# Patient Record
Sex: FEMALE | Race: BLACK OR AFRICAN AMERICAN | NOT HISPANIC OR LATINO | Employment: UNEMPLOYED | URBAN - METROPOLITAN AREA
[De-identification: names, ages, dates, MRNs, and addresses within clinical notes are randomized per-mention and may not be internally consistent; named-entity substitution may affect disease eponyms.]

---

## 2017-07-11 ENCOUNTER — ANESTHESIA EVENT (OUTPATIENT)
Dept: SURGERY | Facility: OTHER | Age: 37
End: 2017-07-11
Payer: MEDICAID

## 2017-07-11 ENCOUNTER — HOSPITAL ENCOUNTER (OUTPATIENT)
Dept: PREADMISSION TESTING | Facility: OTHER | Age: 37
Discharge: HOME | End: 2017-07-11
Attending: PLASTIC SURGERY
Payer: MEDICAID

## 2017-07-11 VITALS
BODY MASS INDEX: 26.66 KG/M2 | HEART RATE: 72 BPM | TEMPERATURE: 98 F | WEIGHT: 160 LBS | OXYGEN SATURATION: 98 % | SYSTOLIC BLOOD PRESSURE: 165 MMHG | DIASTOLIC BLOOD PRESSURE: 91 MMHG | HEIGHT: 65 IN

## 2017-07-11 DIAGNOSIS — D64.9 ANEMIA, UNSPECIFIED TYPE: Primary | ICD-10-CM

## 2017-07-11 LAB
HCT VFR BLD AUTO: 33.1 %
HGB BLD-MCNC: 9.4 G/DL

## 2017-07-11 PROCEDURE — 85014 HEMATOCRIT: CPT

## 2017-07-11 PROCEDURE — 36415 COLL VENOUS BLD VENIPUNCTURE: CPT

## 2017-07-11 PROCEDURE — 85018 HEMOGLOBIN: CPT

## 2017-07-11 RX ORDER — OXYCODONE HYDROCHLORIDE 5 MG/1
5 TABLET ORAL ONCE AS NEEDED
Status: CANCELLED | OUTPATIENT
Start: 2017-07-11 | End: 2017-07-11

## 2017-07-11 RX ORDER — LIDOCAINE HYDROCHLORIDE 10 MG/ML
1 INJECTION, SOLUTION EPIDURAL; INFILTRATION; INTRACAUDAL; PERINEURAL ONCE
Status: CANCELLED | OUTPATIENT
Start: 2017-07-11 | End: 2017-07-11

## 2017-07-11 RX ORDER — SODIUM CHLORIDE, SODIUM LACTATE, POTASSIUM CHLORIDE, CALCIUM CHLORIDE 600; 310; 30; 20 MG/100ML; MG/100ML; MG/100ML; MG/100ML
INJECTION, SOLUTION INTRAVENOUS CONTINUOUS
Status: CANCELLED | OUTPATIENT
Start: 2017-07-11

## 2017-07-11 RX ORDER — FAMOTIDINE 20 MG/1
20 TABLET, FILM COATED ORAL
Status: CANCELLED | OUTPATIENT
Start: 2017-07-11 | End: 2017-07-11

## 2017-07-11 NOTE — DISCHARGE INSTRUCTIONS
PRE-ADMIT TESTING -  [phone]    [address]  [facility]        OUTPATIENT SURGERY UNIT - [phone]    Your surgery has been scheduled at [facility]. We are pleased to have the opportunity to serve you. For Further Information please call [phone].    On the day of surgery please report to the Information Desk on the 1st floor.    · CONTACT YOUR PHYSICIAN'S OFFICE THE DAY PRIOR TO YOUR SURGERY TO OBTAIN YOUR ARRIVAL TIME.     · The evening before surgery do not eat anything after 9 p.m. ( this includes hard candy, chewing gum and mints).  You may only have GATORADE, POWERADE AND WATER  from 9 p.m. until you leave your home.   DO NOT DRINK ANY LIQUIDS ON THE WAY TO THE HOSPITAL.      SPECIAL MEDICATION INSTRUCTIONS: TAKE medications checked off by the Anesthesiologist on your Medication List.    Angiogram Patients: Take medications as instructed by your physician, including aspirin.     Surgery Patients:    If you take ASPIRIN - Your PHYSICIAN/SURGEON will need to inform you IF/OR when you need to stop taking aspirin prior to your surgery.     Do Not take any medications containing IBUPROFEN.  Do Not Wear any make-up or dark nail polish   (especially eye make-up) to surgery. If you come to surgery with makeup on you will be required to remove the makeup or nail polish.    Do not shave your surgical area at least 5 days prior to your surgery. The surgical prep will be performed at the hospital according to Infection Control regulations.    Leave all valuables at home.   Do Not wear any jewelry or watches, including any metal in body piercings.  Contact Lens must be removed before surgery. Either do not wear the contact lens or bring a case and solution for storage.  Please bring a container for eyeglasses or dentures as required.  Bring any paperwork your physician has provided, such as consent forms,  history and physicals, doctor's orders, etc.   Bring comfortable clothes  that are loose fitting to wear upon discharge. Take into consideration the type of surgery being performed.  Maintain your diet as advised per your physician the day prior to surgery.      Adequate rest the night before surgery is advised.   Park in the Parking lot behind the hospital or in the [facility] across the street from the parking lot. Parking is complimentary.  If you will be discharged the same day as your procedure, please arrange for a responsible adult to drive you home or to accompany you if traveling by taxi.   YOU WILL NOT BE PERMITTED TO DRIVE OR TO LEAVE THE HOSPITAL ALONE AFTER SURGERY.   It is strongly recommended that you arrange for someone to remain with you for the first 24 hrs following your surgery.       Thank you for your cooperation.  The Staff of [facility].        Bathing Instructions                                                                 Please shower the evening before and morning of your procedure with    ANTIBACTERIAL SOAP. ( DIAL, etc )  Concentrate on the surgical area   for at least 3 minutes and rinse completely. Dry off as usual.   Do not use any deodorant, powder, body lotions, perfume, after shave or    cologne.

## 2017-07-11 NOTE — ANESTHESIA PREPROCEDURE EVALUATION
07/11/2017  Susanne Hutton is a 37 y.o., female.    Anesthesia Evaluation    I have reviewed the Patient Summary Reports.    I have reviewed the Nursing Notes.   I have reviewed the Medications.     Review of Systems  Anesthesia Hx:  No problems with previous Anesthesia  Denies Family Hx of Anesthesia complications.   Denies Personal Hx of Anesthesia complications.   Social:  Non-Smoker    Hematology/Oncology:        Current/Recent Cancer. Breast left   EENT/Dental:EENT/Dental Normal   Cardiovascular:   Exercise tolerance: good Hypertension    Pulmonary:  Pulmonary Normal    Renal/:  Renal/ Normal     Hepatic/GI:   Liver Disease,    Musculoskeletal:  Musculoskeletal Normal    Neurological:  Neurology Normal    Endocrine:  Endocrine Normal    Dermatological:  Skin Normal    Psych:  Psychiatric Normal           Physical Exam  General:  Well nourished    Airway/Jaw/Neck:  Airway Findings: Mouth Opening: Normal Tongue: Normal  General Airway Assessment: Adult  Mallampati: II  TM Distance: Normal, at least 6 cm      Dental:  Dental Findings: In tact        Mental Status:  Mental Status Findings:  Cooperative, Alert and Oriented         Anesthesia Plan  Type of Anesthesia, risks & benefits discussed:  Anesthesia Type:  general  Patient's Preference:   Intra-op Monitoring Plan: standard ASA monitors  Intra-op Monitoring Plan Comments:   Post Op Pain Control Plan:   Post Op Pain Control Plan Comments:   Induction:   IV  Beta Blocker:         Informed Consent: Patient understands risks and agrees with Anesthesia plan.  Questions answered. Anesthesia consent signed with patient.  ASA Score: 2     Day of Surgery Review of History & Physical:    H&P update referred to the surgeon.     Anesthesia Plan Notes: .        Ready For Surgery From Anesthesia Perspective.

## 2017-07-13 NOTE — PLAN OF CARE
07/13/17 1159   ED Admissions Case Approval   ED Admissions Case Approval (!) CM Approved  (OP )

## 2017-07-14 ENCOUNTER — HOSPITAL ENCOUNTER (OUTPATIENT)
Facility: OTHER | Age: 37
Discharge: HOME | End: 2017-07-14
Attending: PLASTIC SURGERY | Admitting: PLASTIC SURGERY
Payer: MEDICAID

## 2017-07-14 ENCOUNTER — ANESTHESIA (OUTPATIENT)
Dept: SURGERY | Facility: OTHER | Age: 37
End: 2017-07-14
Payer: MEDICAID

## 2017-07-14 VITALS
DIASTOLIC BLOOD PRESSURE: 94 MMHG | BODY MASS INDEX: 26.66 KG/M2 | SYSTOLIC BLOOD PRESSURE: 168 MMHG | OXYGEN SATURATION: 99 % | HEIGHT: 65 IN | TEMPERATURE: 98 F | WEIGHT: 160 LBS | HEART RATE: 70 BPM | RESPIRATION RATE: 16 BRPM

## 2017-07-14 DIAGNOSIS — Z85.3 HX OF MALIGNANT NEOPLASM OF BREAST: ICD-10-CM

## 2017-07-14 DIAGNOSIS — Z85.3 PERSONAL HISTORY OF MALIGNANT NEOPLASM OF BREAST: Primary | ICD-10-CM

## 2017-07-14 LAB
B-HCG UR QL: NEGATIVE
CTP QC/QA: YES

## 2017-07-14 PROCEDURE — 63600175 PHARM REV CODE 636 W HCPCS: Performed by: PLASTIC SURGERY

## 2017-07-14 PROCEDURE — 71000015 HC POSTOP RECOV 1ST HR: Performed by: PLASTIC SURGERY

## 2017-07-14 PROCEDURE — 36000707: Performed by: PLASTIC SURGERY

## 2017-07-14 PROCEDURE — C1789 PROSTHESIS, BREAST, IMP: HCPCS | Performed by: PLASTIC SURGERY

## 2017-07-14 PROCEDURE — 81025 URINE PREGNANCY TEST: CPT | Performed by: SPECIALIST

## 2017-07-14 PROCEDURE — 37000009 HC ANESTHESIA EA ADD 15 MINS: Performed by: PLASTIC SURGERY

## 2017-07-14 PROCEDURE — 63600175 PHARM REV CODE 636 W HCPCS: Performed by: ANESTHESIOLOGY

## 2017-07-14 PROCEDURE — 63600175 PHARM REV CODE 636 W HCPCS: Performed by: NURSE ANESTHETIST, CERTIFIED REGISTERED

## 2017-07-14 PROCEDURE — 36000706: Performed by: PLASTIC SURGERY

## 2017-07-14 PROCEDURE — 27201423 OPTIME MED/SURG SUP & DEVICES STERILE SUPPLY: Performed by: PLASTIC SURGERY

## 2017-07-14 PROCEDURE — 71000033 HC RECOVERY, INTIAL HOUR: Performed by: PLASTIC SURGERY

## 2017-07-14 PROCEDURE — 25000003 PHARM REV CODE 250: Performed by: SPECIALIST

## 2017-07-14 PROCEDURE — 25000003 PHARM REV CODE 250: Performed by: PLASTIC SURGERY

## 2017-07-14 PROCEDURE — 71000039 HC RECOVERY, EACH ADD'L HOUR: Performed by: PLASTIC SURGERY

## 2017-07-14 PROCEDURE — 25000003 PHARM REV CODE 250: Performed by: NURSE ANESTHETIST, CERTIFIED REGISTERED

## 2017-07-14 PROCEDURE — 37000008 HC ANESTHESIA 1ST 15 MINUTES: Performed by: PLASTIC SURGERY

## 2017-07-14 PROCEDURE — C1729 CATH, DRAINAGE: HCPCS | Performed by: PLASTIC SURGERY

## 2017-07-14 PROCEDURE — 71000016 HC POSTOP RECOV ADDL HR: Performed by: PLASTIC SURGERY

## 2017-07-14 DEVICE — IMPLANTABLE DEVICE: Type: IMPLANTABLE DEVICE | Site: BREAST | Status: FUNCTIONAL

## 2017-07-14 RX ORDER — DEXAMETHASONE SODIUM PHOSPHATE 4 MG/ML
INJECTION, SOLUTION INTRA-ARTICULAR; INTRALESIONAL; INTRAMUSCULAR; INTRAVENOUS; SOFT TISSUE
Status: DISCONTINUED | OUTPATIENT
Start: 2017-07-14 | End: 2017-07-14

## 2017-07-14 RX ORDER — PROPOFOL 10 MG/ML
VIAL (ML) INTRAVENOUS
Status: DISCONTINUED | OUTPATIENT
Start: 2017-07-14 | End: 2017-07-14

## 2017-07-14 RX ORDER — SODIUM CHLORIDE, SODIUM LACTATE, POTASSIUM CHLORIDE, CALCIUM CHLORIDE 600; 310; 30; 20 MG/100ML; MG/100ML; MG/100ML; MG/100ML
INJECTION, SOLUTION INTRAVENOUS CONTINUOUS
Status: DISCONTINUED | OUTPATIENT
Start: 2017-07-14 | End: 2017-07-14 | Stop reason: HOSPADM

## 2017-07-14 RX ORDER — LIDOCAINE HCL/PF 100 MG/5ML
SYRINGE (ML) INTRAVENOUS
Status: DISCONTINUED | OUTPATIENT
Start: 2017-07-14 | End: 2017-07-14

## 2017-07-14 RX ORDER — LIDOCAINE HYDROCHLORIDE 10 MG/ML
1 INJECTION, SOLUTION EPIDURAL; INFILTRATION; INTRACAUDAL; PERINEURAL ONCE
Status: DISCONTINUED | OUTPATIENT
Start: 2017-07-14 | End: 2017-07-14 | Stop reason: HOSPADM

## 2017-07-14 RX ORDER — OXYCODONE HYDROCHLORIDE 5 MG/1
5 TABLET ORAL
Status: DISCONTINUED | OUTPATIENT
Start: 2017-07-14 | End: 2017-07-14 | Stop reason: HOSPADM

## 2017-07-14 RX ORDER — ESMOLOL HYDROCHLORIDE 10 MG/ML
INJECTION INTRAVENOUS
Status: DISCONTINUED | OUTPATIENT
Start: 2017-07-14 | End: 2017-07-14

## 2017-07-14 RX ORDER — CEFAZOLIN SODIUM 1 G/3ML
INJECTION, POWDER, FOR SOLUTION INTRAMUSCULAR; INTRAVENOUS
Status: DISCONTINUED | OUTPATIENT
Start: 2017-07-14 | End: 2017-07-14 | Stop reason: HOSPADM

## 2017-07-14 RX ORDER — ROCURONIUM BROMIDE 10 MG/ML
INJECTION, SOLUTION INTRAVENOUS
Status: DISCONTINUED | OUTPATIENT
Start: 2017-07-14 | End: 2017-07-14

## 2017-07-14 RX ORDER — FAMOTIDINE 20 MG/1
20 TABLET, FILM COATED ORAL
Status: COMPLETED | OUTPATIENT
Start: 2017-07-14 | End: 2017-07-14

## 2017-07-14 RX ORDER — BACITRACIN 50000 [IU]/1
INJECTION, POWDER, FOR SOLUTION INTRAMUSCULAR
Status: DISCONTINUED | OUTPATIENT
Start: 2017-07-14 | End: 2017-07-14 | Stop reason: HOSPADM

## 2017-07-14 RX ORDER — GENTAMICIN SULFATE 80 MG/100ML
INJECTION, SOLUTION INTRAVENOUS
Status: DISCONTINUED | OUTPATIENT
Start: 2017-07-14 | End: 2017-07-14 | Stop reason: HOSPADM

## 2017-07-14 RX ORDER — OXYCODONE AND ACETAMINOPHEN 7.5; 325 MG/1; MG/1
1 TABLET ORAL EVERY 4 HOURS PRN
Qty: 45 TABLET | Refills: 0 | Status: SHIPPED | OUTPATIENT
Start: 2017-07-14 | End: 2017-11-28

## 2017-07-14 RX ORDER — FENTANYL CITRATE 50 UG/ML
INJECTION, SOLUTION INTRAMUSCULAR; INTRAVENOUS
Status: DISCONTINUED | OUTPATIENT
Start: 2017-07-14 | End: 2017-07-14

## 2017-07-14 RX ORDER — ACETAMINOPHEN 10 MG/ML
INJECTION, SOLUTION INTRAVENOUS
Status: DISCONTINUED | OUTPATIENT
Start: 2017-07-14 | End: 2017-07-14

## 2017-07-14 RX ORDER — ONDANSETRON 4 MG/1
4 TABLET, ORALLY DISINTEGRATING ORAL EVERY 6 HOURS PRN
Qty: 30 TABLET | Refills: 0 | Status: SHIPPED | OUTPATIENT
Start: 2017-07-14 | End: 2018-05-28

## 2017-07-14 RX ORDER — CEFAZOLIN SODIUM 2 G/50ML
2 SOLUTION INTRAVENOUS
Status: COMPLETED | OUTPATIENT
Start: 2017-07-14 | End: 2017-07-14

## 2017-07-14 RX ORDER — HYDROMORPHONE HYDROCHLORIDE 2 MG/ML
0.4 INJECTION, SOLUTION INTRAMUSCULAR; INTRAVENOUS; SUBCUTANEOUS EVERY 5 MIN PRN
Status: DISCONTINUED | OUTPATIENT
Start: 2017-07-14 | End: 2017-07-14 | Stop reason: HOSPADM

## 2017-07-14 RX ORDER — OXYCODONE HYDROCHLORIDE 5 MG/1
5 TABLET ORAL ONCE AS NEEDED
Status: DISCONTINUED | OUTPATIENT
Start: 2017-07-14 | End: 2017-07-14 | Stop reason: HOSPADM

## 2017-07-14 RX ORDER — SODIUM CHLORIDE 0.9 % (FLUSH) 0.9 %
3 SYRINGE (ML) INJECTION
Status: DISCONTINUED | OUTPATIENT
Start: 2017-07-14 | End: 2017-07-14 | Stop reason: HOSPADM

## 2017-07-14 RX ORDER — MEPERIDINE HYDROCHLORIDE 50 MG/ML
12.5 INJECTION INTRAMUSCULAR; INTRAVENOUS; SUBCUTANEOUS ONCE AS NEEDED
Status: DISCONTINUED | OUTPATIENT
Start: 2017-07-14 | End: 2017-07-14 | Stop reason: HOSPADM

## 2017-07-14 RX ORDER — CEPHALEXIN 500 MG/1
500 CAPSULE ORAL EVERY 6 HOURS
Qty: 28 CAPSULE | Refills: 0 | Status: SHIPPED | OUTPATIENT
Start: 2017-07-14 | End: 2017-07-21

## 2017-07-14 RX ORDER — FENTANYL CITRATE 50 UG/ML
25 INJECTION, SOLUTION INTRAMUSCULAR; INTRAVENOUS EVERY 5 MIN PRN
Status: COMPLETED | OUTPATIENT
Start: 2017-07-14 | End: 2017-07-14

## 2017-07-14 RX ADMIN — FENTANYL CITRATE 50 MCG: 50 INJECTION, SOLUTION INTRAMUSCULAR; INTRAVENOUS at 03:07

## 2017-07-14 RX ADMIN — PROPOFOL 200 MG: 10 INJECTION, EMULSION INTRAVENOUS at 02:07

## 2017-07-14 RX ADMIN — ROCURONIUM BROMIDE 40 MG: 10 INJECTION, SOLUTION INTRAVENOUS at 02:07

## 2017-07-14 RX ADMIN — PROPOFOL 30 MG: 10 INJECTION, EMULSION INTRAVENOUS at 03:07

## 2017-07-14 RX ADMIN — FAMOTIDINE 20 MG: 20 TABLET, FILM COATED ORAL at 11:07

## 2017-07-14 RX ADMIN — FENTANYL CITRATE 100 MCG: 50 INJECTION, SOLUTION INTRAMUSCULAR; INTRAVENOUS at 02:07

## 2017-07-14 RX ADMIN — LIDOCAINE HYDROCHLORIDE 50 MG: 20 INJECTION, SOLUTION INTRAVENOUS at 02:07

## 2017-07-14 RX ADMIN — CEFAZOLIN SODIUM 2 G: 2 SOLUTION INTRAVENOUS at 02:07

## 2017-07-14 RX ADMIN — FENTANYL CITRATE 25 MCG: 50 INJECTION INTRAMUSCULAR; INTRAVENOUS at 05:07

## 2017-07-14 RX ADMIN — HYDROMORPHONE HYDROCHLORIDE 0.4 MG: 2 INJECTION INTRAMUSCULAR; INTRAVENOUS; SUBCUTANEOUS at 05:07

## 2017-07-14 RX ADMIN — ESMOLOL HYDROCHLORIDE 20 MG: 10 INJECTION, SOLUTION INTRAVENOUS at 02:07

## 2017-07-14 RX ADMIN — DEXAMETHASONE SODIUM PHOSPHATE 8 MG: 4 INJECTION, SOLUTION INTRAMUSCULAR; INTRAVENOUS at 02:07

## 2017-07-14 RX ADMIN — SODIUM CHLORIDE, SODIUM LACTATE, POTASSIUM CHLORIDE, AND CALCIUM CHLORIDE: 600; 310; 30; 20 INJECTION, SOLUTION INTRAVENOUS at 12:07

## 2017-07-14 RX ADMIN — SODIUM CHLORIDE, SODIUM LACTATE, POTASSIUM CHLORIDE, AND CALCIUM CHLORIDE: 600; 310; 30; 20 INJECTION, SOLUTION INTRAVENOUS at 02:07

## 2017-07-14 RX ADMIN — HYDROMORPHONE HYDROCHLORIDE 0.4 MG: 2 INJECTION INTRAMUSCULAR; INTRAVENOUS; SUBCUTANEOUS at 06:07

## 2017-07-14 RX ADMIN — ACETAMINOPHEN 1000 MG: 10 INJECTION, SOLUTION INTRAVENOUS at 02:07

## 2017-07-14 NOTE — TRANSFER OF CARE
"Anesthesia Transfer of Care Note    Patient: Susanne Hutton    Procedure(s) Performed: Procedure(s) (LRB):  INSERTION-EXPANDER-BREAST (Left)    Patient location: PACU    Anesthesia Type: general    Transport from OR: Transported from OR on 2-3 L/min O2 by NC with adequate spontaneous ventilation    Post pain: adequate analgesia    Post assessment: no apparent anesthetic complications    Post vital signs: stable    Level of consciousness: awake, alert and oriented    Nausea/Vomiting: no nausea/vomiting    Complications: none    Transfer of care protocol was followed      Last vitals:   Visit Vitals  BP (!) 160/90 (BP Location: Right arm, Patient Position: Lying, BP Method: Automatic)   Pulse 81   Temp 37.2 °C (98.9 °F) (Oral)   Resp 16   Ht 5' 5" (1.651 m)   Wt 72.6 kg (160 lb)   SpO2 100%   Breastfeeding? No   BMI 26.63 kg/m²     "

## 2017-07-14 NOTE — PLAN OF CARE
Patient prefers to have Lorenza (Mom) present for discharge teaching. Please contact them @[phone].

## 2017-07-14 NOTE — OR NURSING
Sleeping and snoring, wakes up easily states pain is getting better, 8, then falls back to sleep. Face relaxed.

## 2017-07-14 NOTE — DISCHARGE SUMMARY
[facility]  Short Stay  Discharge Summary    Admit Date: 7/14/2017    Discharge Date and Time:  07/14/2017 4:28 PM      Discharge Attending Physician: David Arenas MD     Hospital Course (synopsis of major diagnoses, care, treatment, and services provided during the course of the hospital stay): same day surgery    Final Diagnoses:    Principal Problem: Hx of malignant neoplasm of breast    Discharged Condition: stable    Disposition: Home or Self Care    Follow up/Patient Instructions:    Medications:  Reconciled Home Medications:   Current Discharge Medication List      START taking these medications    Details   cephALEXin (KEFLEX) 500 MG capsule Take 1 capsule (500 mg total) by mouth every 6 (six) hours.  Qty: 28 capsule, Refills: 0      ondansetron (ZOFRAN-ODT) 4 MG TbDL Take 1 tablet (4 mg total) by mouth every 6 (six) hours as needed.  Qty: 30 tablet, Refills: 0      oxycodone-acetaminophen (PERCOCET) 7.5-325 mg per tablet Take 1 tablet by mouth every 4 (four) hours as needed for Pain.  Qty: 45 tablet, Refills: 0         CONTINUE these medications which have NOT CHANGED    Details   amlodipine (NORVASC) 2.5 MG tablet Take 5 mg by mouth once daily.      clonazePAM (KLONOPIN) 1 MG tablet Take 1 mg by mouth 2 (two) times daily as needed for Anxiety.      CYANOCOBALAMIN, VITAMIN B-12, (VITAMIN B-12 ORAL) Take by mouth once daily.      FERROUS SULFATE (IRON ORAL) Take by mouth once daily.      lisinopril 10 MG tablet Take 10 mg by mouth once daily.      ibuprofen (ADVIL,MOTRIN) 200 MG tablet Take 200 mg by mouth every 6 (six) hours as needed. Last dose 5/2/14             Discharge Procedure Orders  Diet general     Lifting restrictions   Scheduling Instructions: No lifting/pushing/pulling >5lbs.  Keep support garment on at all times     Call MD for:  temperature >100.4     Call MD for:  persistent nausea and vomiting or diarrhea     Call MD for:  severe uncontrolled pain     Call MD for:   redness, tenderness, or signs of infection (pain, swelling, redness, odor or green/yellow discharge around incision site)     Call MD for:  difficulty breathing or increased cough     Call MD for:  severe persistent headache     Call MD for:  worsening rash     Call MD for:  persistent dizziness, light-headedness, or visual disturbances     Call MD for:  increased confusion or weakness     Change dressing (specify)   Order Comments: Dressing change: 1 times per day using gauze padding.       Follow-up Information     David Arenas MD In 1 week.    Specialty:  Plastic Surgery  Contact information:  [address] [address] [phone]

## 2017-07-14 NOTE — OR NURSING
Dr Duncan notified darlene rodrigues 20oz coke at 0830. Stated to admin Pepcid. Will continue to monitor.

## 2017-07-14 NOTE — DISCHARGE INSTRUCTIONS
Discharge Instructions: After Your Surgery  Youve just had surgery. During surgery you were given medicine called anesthesia to keep you relaxed and free of pain. After surgery you may have some pain or nausea. This is common. Here are some tips for feeling better and getting well after surgery.     Stay on schedule with your medication.     Going home  Your doctor or nurse will show you how to take care of yourself when you go home. He or she will also answer your questions. Have an adult family member or friend drive you home. For the first 24 hours after your surgery:    · Do not drive or use heavy equipment.  · Do not make important decisions or sign legal papers.  · Do not drink alcohol.  · Have someone stay with you, if needed. He or she can watch for problems and help keep you safe.    Be sure to go to all follow-up visits with your doctor. And rest after your surgery for as long as your doctor tells you to.    Coping with pain  If you have pain after surgery, pain medicine will help you feel better. Take it as told, before pain becomes severe. Also, ask your doctor or pharmacist about other ways to control pain. This might be with heat, ice, or relaxation. And follow any other instructions your surgeon or nurse gives you.    Tips for taking pain medicine  To get the best relief possible, remember these points:    · Pain medicines can upset your stomach. Taking them with a little food may help.  · Most pain relievers taken by mouth need at least 20 to 30 minutes to start to work.  · Taking medicine on a schedule can help you remember to take it. Try to time your medicine so that you can take it before starting an activity. This might be before you get dressed, go for a walk, or sit down for dinner.  · Constipation is a common side effect of pain medicines. Call your doctor before taking any medicines such as laxatives or stool softeners to help ease constipation. Also ask if you should skip any foods.  Drinking lots of fluids and eating foods such as fruits and vegetables that are high in fiber can also help. Remember, do not take laxatives unless your surgeon has prescribed them.  · Drinking alcohol and taking pain medicine can cause dizziness and slow your breathing. It can even be deadly. Do not drink alcohol while taking pain medicine.  · Pain medicine can make you react more slowly to things. Do not drive or run machinery while taking pain medicine.    Your health care provider may tell you to take acetaminophen to help ease your pain. Ask him or her how much you are supposed to take each day. Acetaminophen or other pain relievers may interact with your prescription medicines or other over-the-counter (OTC) drugs. Some prescription medicines have acetaminophen and other ingredients. Using both prescription and OTC acetaminophen for pain can cause you to overdose. Read the labels on your OTC medicines with care. This will help you to clearly know the list of ingredients, how much to take, and any warnings. It may also help you not take too much acetaminophen. If you have questions or do not understand the information, ask your pharmacist or health care provider to explain it to you before you take the OTC medicine.    Managing nausea  Some people have an upset stomach after surgery. This is often because of anesthesia, pain, or pain medicine, or the stress of surgery. These tips will help you handle nausea and eat healthy foods as you get better. If you were on a special food plan before surgery, ask your doctor if you should follow it while you get better. These tips may help:    · Do not push yourself to eat. Your body will tell you when to eat and how much.  · Start off with clear liquids and soup. They are easier to digest.  · Next try semi-solid foods, such as mashed potatoes, applesauce, and gelatin, as you feel ready.  · Slowly move to solid foods. Dont eat fatty, rich, or spicy foods at first.  · Do  not force yourself to have 3 large meals a day. Instead eat smaller amounts more often.  · Take pain medicines with a small amount of solid food, such as crackers or toast, to avoid nausea.     Call your surgeon if  · You still have pain an hour after taking medicine. The medicine may not be strong enough.  · You feel too sleepy, dizzy, or groggy. The medicine may be too strong.  · You have side effects like nausea, vomiting, or skin changes, such as rash, itching, or hives.       If you have obstructive sleep apnea  You were given anesthesia medicine during surgery to keep you comfortable and free of pain. After surgery, you may have more apnea spells because of this medicine and other medicines you were given. The spells may last longer than usual.   At home:    · Keep using the continuous positive airway pressure (CPAP) device when you sleep. Unless your health care provider tells you not to, use it when you sleep, day or night. CPAP is a common device used to treat obstructive sleep apnea.  · Talk with your provider before taking any pain medicine, muscle relaxants, or sedatives. Your provider will tell you about the possible dangers of taking these medicines.    © 2739-6813 [employer]. [address] [place] [address], [place] [address]. All rights reserved. This information is not intended as a substitute for professional medical care. Always follow your healthcare professional's instructions.    PLEASE FOLLOW ANY OTHER INSTRUCTIONS PROVIDED TO YOU BY DR. ALEXANDER!

## 2017-07-14 NOTE — INTERVAL H&P NOTE
The patient has been examined and the H&P has been reviewed:    I concur with the findings and no changes have occurred since H&P was written.    Anesthesia/Surgery risks, benefits and alternative options discussed and understood by patient/family.          Active Hospital Problems    Diagnosis  POA    Hx of malignant neoplasm of breast [Z85.3]  Not Applicable      Resolved Hospital Problems    Diagnosis Date Resolved POA   No resolved problems to display.

## 2017-07-14 NOTE — BRIEF OP NOTE
[facility]  Surgery Department  Operative Note    SUMMARY     Date of Procedure: 7/14/2017     Procedure: Procedure(s) (LRB):  INSERTION-EXPANDER-BREAST (Left)     Surgeon(s) and Role:     * David Arenas MD - Primary    Assisting Surgeon: Luisito    Pre-Operative Diagnosis: Lump or mass in breast [N63]  H/O mastectomy, left [Z90.12]  H/O malignant neoplasm of breast [Z85.3]    Post-Operative Diagnosis: Post-Op Diagnosis Codes:     * Lump or mass in breast [N63]     * H/O mastectomy, left [Z90.12]     * H/O malignant neoplasm of breast [Z85.3]    Anesthesia: General    Description of the Findings of the Procedure: 200cc NS placed into expander    Complications: No    Estimated Blood Loss (EBL): * No values recorded between 7/14/2017  2:44 PM and 7/14/2017  4:27 PM *           Implants:   Implant Name Type Inv. Item Serial No.  Lot No. LRB No. Used   [id number]   [id number] MUSCULOSKELETAL TRANSPLANT FND  Left 1   Eckert Artelioa High Profile, Smooth Breast Tissue Expander       [employer] [id number] Left 1       Specimens:   Specimen (12h ago through future)    None                  Condition: Stable    Disposition: PACU - hemodynamically stable.    Attestation: I was present and scrubbed for the entire procedure.

## 2017-07-14 NOTE — ANESTHESIA POSTPROCEDURE EVALUATION
"Anesthesia Post Evaluation    Patient: Susanne Hutton    Procedure(s) Performed: Procedure(s) (LRB):  INSERTION-EXPANDER-BREAST (Left)    Final Anesthesia Type: general  Patient location during evaluation: PACU  Patient participation: Yes- Able to Participate  Level of consciousness: awake and alert  Post-procedure vital signs: reviewed and stable  Pain management: adequate  Airway patency: patent  PONV status at discharge: No PONV  Anesthetic complications: no      Cardiovascular status: blood pressure returned to baseline  Respiratory status: unassisted, spontaneous ventilation and room air  Hydration status: euvolemic  Follow-up not needed.        Visit Vitals  BP (!) 160/90 (BP Location: Right arm, Patient Position: Lying, BP Method: Automatic)   Pulse 81   Temp 37.2 °C (98.9 °F) (Oral)   Resp 16   Ht 5' 5" (1.651 m)   Wt 72.6 kg (160 lb)   SpO2 100%   Breastfeeding? No   BMI 26.63 kg/m²       Pain/Tiffany Score: Pain Assessment Performed: Yes (7/14/2017  4:43 PM)  Presence of Pain: non-verbal indicators absent (7/14/2017  4:43 PM)  Tiffany Score: 8 (7/14/2017  4:43 PM)      "

## 2017-07-15 ENCOUNTER — NURSE TRIAGE (OUTPATIENT)
Dept: ADMINISTRATIVE | Facility: CLINIC | Age: 37
End: 2017-07-15

## 2017-07-15 NOTE — PLAN OF CARE
Susanne Hutton has met all discharge criteria from Phase II. Vital Signs are stable, ambulating  without difficulty. Discharge instructions given, patient verbalized understanding. Discharged from facility via wheelchair in stable condition.

## 2017-07-15 NOTE — TELEPHONE ENCOUNTER
Had a procedure done on yesterday. PCP had given Rx for headache last month. Went to get Rx filled that was given after procedure on yesterday and pharmacy would not fill due to having another narcotic filled recently. Rx was given back to pt. Pt advised to ED for severe pain and contact surgeon when office opens for a possible replacement Rx.    Reason for Disposition   Caller requesting a NON-URGENT new prescription or refill and triager unable to refill per unit policy    Protocols used: ST MEDICATION QUESTION CALL-A-AH

## 2017-07-25 PROBLEM — I21.02 ACUTE ST ELEVATION MYOCARDIAL INFARCTION (STEMI) INVOLVING LEFT ANTERIOR DESCENDING (LAD) CORONARY ARTERY: Status: ACTIVE | Noted: 2017-07-25

## 2017-07-25 PROBLEM — I21.3 ST ELEVATION MYOCARDIAL INFARCTION (STEMI): Status: ACTIVE | Noted: 2017-07-25

## 2017-08-05 NOTE — OP NOTE
DATE OF PROCEDURE:  07/14/2017    PREOPERATIVE DIAGNOSIS:  Left mastectomy defect.    POSTOPERATIVE DIAGNOSIS:  Left mastectomy defect.    PLANNED AND PRACTICED SURGERY:  Left delayed expander placement.    SURGEON:  David Arenas M.D.    ASSISTANT:  Marek Jorge M.D.    ANESTHESIA:  Procedure was carried out under general anesthesia.    BLOOD LOSS:  Minimal.    COMPLICATIONS:  There were no complications.    Procedure was well tolerated.    ESTIMATED BLOOD LOSS:  100 mL.    OPERATIVE NOTE:  After discussing treatment alternatives with associated risks   and benefits, the patient agreed to the aforementioned procedure.  Preoperative   markings were made in the holding area.  The patient was then brought into the   Operating Room in stable condition.  In the Operating Room, the patient was   placed in the supine position.  After induction of general anesthesia with   endotracheal intubation, the patient was prepped and draped in usual sterile   fashion.  We reentered the previous mastectomy incision to get into our breast   pocket for the expander placement.  Dissection was carried down with Bovie   electrocautery.  The pectoralis muscle was identified.  It was then released   superiorly and medially.  A piece of FlexHD was then placed in triple antibiotic   solution.  It was then sutured to the IMF inferiorly and laterally to the chest   wall.  [place] 475 mL expander was then placed in triple antibiotic   solution.  All the air was removed.  The expander was then placed into the   breast pocket after changing gloves and washing the pocket and the surrounding   skin with triple antibiotic solution.  Following this, Vicryl 2-0 sutures were   used to tack the expander at several points along its path and placed on the   chest wall.  The FlexHD was then sutured to the superior pectoralis major muscle   with a pants-over-vest fashion with Vicryl 2-0 sutures.  Following this, a 19   John drain was brought out  laterally through the breast pocket and sutured in   place with nylon 3-0 sutures.  The expander was then expanded with 200 mL of   normal saline and then the breast incision was closed with intradermal Monocryl   3-0 followed by running subcuticular Monocryl 3-0.  The wounds were then dressed   with Dermabond glue.  Chlorhexidine disc followed by Tegaderm was placed in the   drain site.  A surgical bra was then placed on the patient.  The patient was   then revived and returned to the Recovery Room in stable condition.      JONATHAN/  dd: 08/04/2017 18:05:26 (CDT)  td: 08/05/2017 00:08:21 (CDT)  Doc ID   #[id number]  Job ID #[id number]    CC:

## 2017-08-14 PROBLEM — I25.83 CORONARY ARTERY DISEASE DUE TO LIPID RICH PLAQUE: Status: ACTIVE | Noted: 2017-08-14

## 2017-08-14 PROBLEM — I25.10 CORONARY ARTERY DISEASE DUE TO LIPID RICH PLAQUE: Status: ACTIVE | Noted: 2017-08-14

## 2017-08-14 PROBLEM — E78.00 HYPERCHOLESTEREMIA: Status: ACTIVE | Noted: 2017-08-14

## 2017-08-21 ENCOUNTER — ANESTHESIA EVENT (OUTPATIENT)
Dept: SURGERY | Facility: OTHER | Age: 37
End: 2017-08-21
Payer: MEDICAID

## 2017-08-21 NOTE — ANESTHESIA PREPROCEDURE EVALUATION
08/21/2017  Susanne Hutton is a 37 y.o., female.    Anesthesia Evaluation    I have reviewed the Patient Summary Reports.    I have reviewed the Nursing Notes.   I have reviewed the Medications.     Review of Systems  Anesthesia Hx:  Denies Family Hx of Anesthesia complications.   Denies Personal Hx of Anesthesia complications.   Social:  Non-Smoker    Hematology/Oncology:         -- Anemia: Hematology Comments: H/H 10/35 Current/Recent Cancer. Breast surgery    Cardiovascular:   Hypertension Past MI CAD  CABG/stent  hyperlipidemia    Pulmonary:  Pulmonary Normal    Hepatic/GI:   Liver Disease,    Neurological:  Neurology Normal    Endocrine:  Endocrine Normal        Physical Exam  General:  Well nourished    Airway/Jaw/Neck:  Airway Findings: Mouth Opening: Normal Tongue: Normal  General Airway Assessment: Adult  Mallampati: III  TM Distance: 4 - 6 cm      Dental:  Dental Findings: In tact        Mental Status:  Mental Status Findings:  Alert and Oriented, Cooperative         Anesthesia Plan  Type of Anesthesia, risks & benefits discussed:  Anesthesia Type:  MAC  Patient's Preference:   Intra-op Monitoring Plan:   Intra-op Monitoring Plan Comments:   Post Op Pain Control Plan:   Post Op Pain Control Plan Comments:   Induction:   IV  Beta Blocker:         Informed Consent: Patient understands risks and agrees with Anesthesia plan.  Questions answered. Anesthesia consent signed with patient.  ASA Score: 3     Day of Surgery Review of History & Physical:    H&P update referred to the surgeon.     Anesthesia Plan Notes: Patient had surgery here 7/14/17. Had MI 7/25/17. Patient had drug aluding stent placed. Cleared by cardiology for present procedure.   1. Patient is doing well post PCI, continue all current medications without changes. She is instructed that she may not hold Brilinta or Plavix due to  recent stenting.   2. Lipid panel, CMP and Ck in 4 weeks.   3. Follow up in 4 months with Dr. Salvador or sooner if needed.         Ready For Surgery From Anesthesia Perspective.

## 2017-08-21 NOTE — PRE ADMISSION SCREENING
Pre-op instructions given,verbalized understanding,patient stated she was instructed by cardiology to continue taking her brilinta and aspirin,stated stevenson alcala was notified and ok'd by .

## 2017-08-22 ENCOUNTER — HOSPITAL ENCOUNTER (OUTPATIENT)
Facility: OTHER | Age: 37
Discharge: HOME | End: 2017-08-22
Attending: SURGERY | Admitting: SURGERY
Payer: MEDICAID

## 2017-08-22 ENCOUNTER — ANESTHESIA (OUTPATIENT)
Dept: SURGERY | Facility: OTHER | Age: 37
End: 2017-08-22
Payer: MEDICAID

## 2017-08-22 VITALS
SYSTOLIC BLOOD PRESSURE: 134 MMHG | WEIGHT: 158 LBS | DIASTOLIC BLOOD PRESSURE: 76 MMHG | HEIGHT: 65 IN | BODY MASS INDEX: 26.33 KG/M2 | OXYGEN SATURATION: 100 % | RESPIRATION RATE: 16 BRPM | TEMPERATURE: 98 F | HEART RATE: 72 BPM

## 2017-08-22 DIAGNOSIS — Z85.3 HISTORY OF MALIGNANT NEOPLASM OF BREAST: Primary | ICD-10-CM

## 2017-08-22 LAB
B-HCG UR QL: NEGATIVE
CTP QC/QA: YES

## 2017-08-22 PROCEDURE — 37000009 HC ANESTHESIA EA ADD 15 MINS: Performed by: SURGERY

## 2017-08-22 PROCEDURE — 71000016 HC POSTOP RECOV ADDL HR: Performed by: SURGERY

## 2017-08-22 PROCEDURE — 71000015 HC POSTOP RECOV 1ST HR: Performed by: SURGERY

## 2017-08-22 PROCEDURE — 63600175 PHARM REV CODE 636 W HCPCS: Performed by: NURSE ANESTHETIST, CERTIFIED REGISTERED

## 2017-08-22 PROCEDURE — 88305 TISSUE EXAM BY PATHOLOGIST: CPT | Performed by: PATHOLOGY

## 2017-08-22 PROCEDURE — 37000008 HC ANESTHESIA 1ST 15 MINUTES: Performed by: SURGERY

## 2017-08-22 PROCEDURE — 25000003 PHARM REV CODE 250: Performed by: NURSE ANESTHETIST, CERTIFIED REGISTERED

## 2017-08-22 PROCEDURE — 88305 TISSUE EXAM BY PATHOLOGIST: CPT | Mod: 26,,, | Performed by: PATHOLOGY

## 2017-08-22 PROCEDURE — 63600175 PHARM REV CODE 636 W HCPCS: Performed by: SURGERY

## 2017-08-22 PROCEDURE — 36000707: Performed by: SURGERY

## 2017-08-22 PROCEDURE — 25000003 PHARM REV CODE 250: Performed by: SURGERY

## 2017-08-22 PROCEDURE — 88300 SURGICAL PATH GROSS: CPT | Mod: 26,,, | Performed by: PATHOLOGY

## 2017-08-22 PROCEDURE — 25000003 PHARM REV CODE 250: Performed by: ANESTHESIOLOGY

## 2017-08-22 PROCEDURE — 36000706: Performed by: SURGERY

## 2017-08-22 PROCEDURE — 81025 URINE PREGNANCY TEST: CPT | Performed by: SURGERY

## 2017-08-22 RX ORDER — LIDOCAINE HYDROCHLORIDE AND EPINEPHRINE 10; 10 MG/ML; UG/ML
INJECTION, SOLUTION INFILTRATION; PERINEURAL
Status: DISCONTINUED | OUTPATIENT
Start: 2017-08-22 | End: 2017-08-22 | Stop reason: HOSPADM

## 2017-08-22 RX ORDER — DEXAMETHASONE SODIUM PHOSPHATE 4 MG/ML
INJECTION, SOLUTION INTRA-ARTICULAR; INTRALESIONAL; INTRAMUSCULAR; INTRAVENOUS; SOFT TISSUE
Status: DISCONTINUED | OUTPATIENT
Start: 2017-08-22 | End: 2017-08-22

## 2017-08-22 RX ORDER — SODIUM CHLORIDE 0.9 % (FLUSH) 0.9 %
3 SYRINGE (ML) INJECTION
Status: DISCONTINUED | OUTPATIENT
Start: 2017-08-22 | End: 2017-08-22 | Stop reason: HOSPADM

## 2017-08-22 RX ORDER — ONDANSETRON HYDROCHLORIDE 2 MG/ML
INJECTION, SOLUTION INTRAMUSCULAR; INTRAVENOUS
Status: DISCONTINUED | OUTPATIENT
Start: 2017-08-22 | End: 2017-08-22

## 2017-08-22 RX ORDER — PROPOFOL 10 MG/ML
VIAL (ML) INTRAVENOUS
Status: DISCONTINUED | OUTPATIENT
Start: 2017-08-22 | End: 2017-08-22

## 2017-08-22 RX ORDER — OXYCODONE HYDROCHLORIDE 5 MG/1
5 TABLET ORAL
Status: DISCONTINUED | OUTPATIENT
Start: 2017-08-22 | End: 2017-08-22 | Stop reason: HOSPADM

## 2017-08-22 RX ORDER — SODIUM CHLORIDE, SODIUM LACTATE, POTASSIUM CHLORIDE, CALCIUM CHLORIDE 600; 310; 30; 20 MG/100ML; MG/100ML; MG/100ML; MG/100ML
INJECTION, SOLUTION INTRAVENOUS CONTINUOUS PRN
Status: DISCONTINUED | OUTPATIENT
Start: 2017-08-22 | End: 2017-08-22

## 2017-08-22 RX ORDER — FENTANYL CITRATE 50 UG/ML
INJECTION, SOLUTION INTRAMUSCULAR; INTRAVENOUS
Status: DISCONTINUED | OUTPATIENT
Start: 2017-08-22 | End: 2017-08-22

## 2017-08-22 RX ORDER — ACETAMINOPHEN 10 MG/ML
INJECTION, SOLUTION INTRAVENOUS
Status: DISCONTINUED | OUTPATIENT
Start: 2017-08-22 | End: 2017-08-22

## 2017-08-22 RX ORDER — MIDAZOLAM HYDROCHLORIDE 1 MG/ML
INJECTION INTRAMUSCULAR; INTRAVENOUS
Status: DISCONTINUED | OUTPATIENT
Start: 2017-08-22 | End: 2017-08-22

## 2017-08-22 RX ORDER — PROPOFOL 10 MG/ML
VIAL (ML) INTRAVENOUS CONTINUOUS PRN
Status: DISCONTINUED | OUTPATIENT
Start: 2017-08-22 | End: 2017-08-22

## 2017-08-22 RX ORDER — FENTANYL CITRATE 50 UG/ML
25 INJECTION, SOLUTION INTRAMUSCULAR; INTRAVENOUS EVERY 5 MIN PRN
Status: DISCONTINUED | OUTPATIENT
Start: 2017-08-22 | End: 2017-08-22 | Stop reason: HOSPADM

## 2017-08-22 RX ORDER — HYDROMORPHONE HYDROCHLORIDE 2 MG/ML
0.4 INJECTION, SOLUTION INTRAMUSCULAR; INTRAVENOUS; SUBCUTANEOUS EVERY 5 MIN PRN
Status: DISCONTINUED | OUTPATIENT
Start: 2017-08-22 | End: 2017-08-22 | Stop reason: HOSPADM

## 2017-08-22 RX ORDER — CEFAZOLIN SODIUM 2 G/50ML
2 SOLUTION INTRAVENOUS
Status: COMPLETED | OUTPATIENT
Start: 2017-08-22 | End: 2017-08-22

## 2017-08-22 RX ORDER — LIDOCAINE HCL/PF 100 MG/5ML
SYRINGE (ML) INTRAVENOUS
Status: DISCONTINUED | OUTPATIENT
Start: 2017-08-22 | End: 2017-08-22

## 2017-08-22 RX ORDER — ONDANSETRON 4 MG/1
4 TABLET, ORALLY DISINTEGRATING ORAL EVERY 6 HOURS PRN
Qty: 30 TABLET | Refills: 0 | Status: SHIPPED | OUTPATIENT
Start: 2017-08-22 | End: 2017-11-28

## 2017-08-22 RX ORDER — OXYCODONE AND ACETAMINOPHEN 5; 325 MG/1; MG/1
1 TABLET ORAL EVERY 4 HOURS PRN
Qty: 30 TABLET | Refills: 0 | Status: ON HOLD | OUTPATIENT
Start: 2017-08-22 | End: 2017-09-19 | Stop reason: HOSPADM

## 2017-08-22 RX ORDER — MEPERIDINE HYDROCHLORIDE 50 MG/ML
12.5 INJECTION INTRAMUSCULAR; INTRAVENOUS; SUBCUTANEOUS ONCE AS NEEDED
Status: DISCONTINUED | OUTPATIENT
Start: 2017-08-22 | End: 2017-08-22 | Stop reason: HOSPADM

## 2017-08-22 RX ORDER — GLYCOPYRROLATE 0.2 MG/ML
INJECTION INTRAMUSCULAR; INTRAVENOUS
Status: DISCONTINUED | OUTPATIENT
Start: 2017-08-22 | End: 2017-08-22

## 2017-08-22 RX ADMIN — SODIUM CHLORIDE, SODIUM LACTATE, POTASSIUM CHLORIDE, AND CALCIUM CHLORIDE: 600; 310; 30; 20 INJECTION, SOLUTION INTRAVENOUS at 10:08

## 2017-08-22 RX ADMIN — DEXAMETHASONE SODIUM PHOSPHATE 4 MG: 4 INJECTION, SOLUTION INTRAMUSCULAR; INTRAVENOUS at 11:08

## 2017-08-22 RX ADMIN — LIDOCAINE HYDROCHLORIDE 100 MG: 20 INJECTION, SOLUTION INTRAVENOUS at 10:08

## 2017-08-22 RX ADMIN — CEFAZOLIN SODIUM 2 G: 2 SOLUTION INTRAVENOUS at 11:08

## 2017-08-22 RX ADMIN — ACETAMINOPHEN 1000 MG: 10 INJECTION, SOLUTION INTRAVENOUS at 11:08

## 2017-08-22 RX ADMIN — FENTANYL CITRATE 50 MCG: 50 INJECTION, SOLUTION INTRAMUSCULAR; INTRAVENOUS at 11:08

## 2017-08-22 RX ADMIN — MIDAZOLAM HYDROCHLORIDE 2 MG: 1 INJECTION, SOLUTION INTRAMUSCULAR; INTRAVENOUS at 10:08

## 2017-08-22 RX ADMIN — PROPOFOL 100 MCG/KG/MIN: 10 INJECTION, EMULSION INTRAVENOUS at 10:08

## 2017-08-22 RX ADMIN — FENTANYL CITRATE 50 MCG: 50 INJECTION, SOLUTION INTRAMUSCULAR; INTRAVENOUS at 10:08

## 2017-08-22 RX ADMIN — PROPOFOL 30 MG: 10 INJECTION, EMULSION INTRAVENOUS at 11:08

## 2017-08-22 RX ADMIN — GLYCOPYRROLATE 0.2 MG: 0.2 INJECTION, SOLUTION INTRAMUSCULAR; INTRAVENOUS at 11:08

## 2017-08-22 RX ADMIN — ONDANSETRON 4 MG: 2 INJECTION, SOLUTION INTRAMUSCULAR; INTRAVENOUS at 11:08

## 2017-08-22 NOTE — DISCHARGE INSTRUCTIONS
Wound Care         Wash Your Hands  · Use liquid soap and lather for 2 minutes. Scrub between your fingers and under your nails.  · Rinse with warm water, keeping your fingers pointing down.  · Use a paper towel to dry your hands and to turn off the faucet.  Remove the Used Dressing  · Set up your supplies.  · Put on disposable gloves if youre dressing a wound for someone else or your wound is infected.  · Gently take off the old dressing. If you have a drain or tube in the wound, be careful not to pull on it.  · Loosen the tape by pulling gently toward the wound.  · Remove the dressing one layer at a time and put it in a plastic bag.  · Remove your gloves.  Inspect and Dress the Wound  · Each time you change the dressing, inspect the wound carefully to be sure its healing normally.  · Wash your hands again. Put on a new pair of gloves if youre dressing a wound for someone else or if your wound is infected.  · Clean and dress the wound as directed by your doctor or nurse. If you have a drain or tube, be careful not to pull on it.  · Put all supplies in a plastic bag; seal the bag and put it in the trash.  · Be sure to wash your hands again.      Call your healthcare provider if you see any of the following signs of a problem:  · Bleeding that soaks the dressing  · Pink fluid weeping from the wound  · Increased drainage or drainage that is yellow, yellow-green, or foul-smelling  · Increased swelling or pain, or redness or swelling in the skin around the wound  · A change in the color of the wound  · An increase in the size of the wound  · A fever over 101.0°F, increased fatigue, or a loss of appetite.     Anesthesia: Monitored Anesthesia Care (MAC)    Anesthesia Safety  · Have an adult family member or friend drive you home after the procedure.  · For the first 24 hours after your surgery:  ¨ Do not drive or use heavy equipment.  ¨ Do not make important decisions or sign documents.  ¨ Avoid alcohol.  ¨ Have  someone stay with you, if possible. They can watch for problems and help keep you safe.

## 2017-08-22 NOTE — H&P
I personally met with the patient in the preop holding area. I reviewed the procedure in detail with her. I informed her she would come out of the procedure with a Penrose drain, and that the expander and the ADM would be removed.    All of her questions were answered.  No interval changes.

## 2017-08-22 NOTE — BRIEF OP NOTE
[facility]  Surgery Department  Operative Note    SUMMARY     Date of Procedure: 8/22/2017     Procedure: Procedure(s) (LRB):  REMOVAL-EXPANDER-TISSUE (Left)  DEBRIDEMENT-WOUND - DEBRIDEMENT OF LEFT BREAST (Left)     Surgeon(s) and Role:     * Saqib Ty MD - Primary    Assisting Surgeon: None    Pre-Operative Diagnosis: H/O malignant neoplasm of breast [Z85.3]    Post-Operative Diagnosis: Post-Op Diagnosis Codes:     * H/O malignant neoplasm of breast [Z85.3]    Anesthesia: General    Complications: No    Estimated Blood Loss (EBL): * No values recorded between 8/22/2017 12:00 AM and 8/22/2017 11:05 AM *           Implants: * No implants in log *    Specimens:   Specimen (12h ago through future)    None                  Condition: Stable    Disposition: PACU - hemodynamically stable.    Attestation: I was present and scrubbed for the entire procedure.

## 2017-08-22 NOTE — DISCHARGE SUMMARY
[facility]  Short Stay  Discharge Summary    Admit Date: 8/22/2017    Discharge Date and Time:  08/22/2017 11:08 AM      Discharge Attending Physician: Saqib Ty MD     Hospital Course (synopsis of major diagnoses, care, treatment, and services provided during the course of the hospital stay): same day surgery    Final Diagnoses:    Principal Problem: history of breast ca    Discharged Condition: stable    Disposition: Home or Self Care    Follow up/Patient Instructions:    Medications:  Reconciled Home Medications:   Current Discharge Medication List      START taking these medications    Details   !! ondansetron (ZOFRAN-ODT) 4 MG TbDL Take 1 tablet (4 mg total) by mouth every 6 (six) hours as needed.  Qty: 30 tablet, Refills: 0      oxycodone-acetaminophen (PERCOCET) 5-325 mg per tablet Take 1 tablet by mouth every 4 (four) hours as needed.  Qty: 30 tablet, Refills: 0       !! - Potential duplicate medications found. Please discuss with provider.      CONTINUE these medications which have NOT CHANGED    Details   CYANOCOBALAMIN, VITAMIN B-12, (VITAMIN B-12 ORAL) Take by mouth once daily.      FERROUS SULFATE (IRON ORAL) Take by mouth once daily.      lisinopril (PRINIVIL,ZESTRIL) 20 MG tablet Take 0.5 tablets (10 mg total) by mouth once daily.  Qty: 30 tablet, Refills: 3    Associated Diagnoses: Essential hypertension      metoprolol succinate (TOPROL-XL) 50 MG 24 hr tablet Take 1 tablet (50 mg total) by mouth once daily.  Qty: 30 tablet, Refills: 3      oxycodone-acetaminophen (PERCOCET) 7.5-325 mg per tablet Take 1 tablet by mouth every 4 (four) hours as needed for Pain.  Qty: 45 tablet, Refills: 0      ticagrelor (BRILINTA) 90 mg tablet Take 1 tablet (90 mg total) by mouth 2 (two) times daily.  Qty: 60 tablet, Refills: 3      aspirin (ECOTRIN) 81 MG EC tablet Take 1 tablet (81 mg total) by mouth once daily.  Refills: 0      atorvastatin (LIPITOR) 40 MG tablet Take 1 tablet (40 mg total)  by mouth every evening.  Qty: 30 tablet, Refills: 3      clonazePAM (KLONOPIN) 1 MG tablet Take 1 mg by mouth 2 (two) times daily as needed for Anxiety.      ibuprofen (ADVIL,MOTRIN) 200 MG tablet Take 200 mg by mouth every 6 (six) hours as needed. Last dose 5/2/14      !! ondansetron (ZOFRAN-ODT) 4 MG TbDL Take 1 tablet (4 mg total) by mouth every 6 (six) hours as needed.  Qty: 30 tablet, Refills: 0       !! - Potential duplicate medications found. Please discuss with provider.          Discharge Procedure Orders  Diet general     Lifting restrictions   Scheduling Instructions: No lifting/pushing/pulling >5lbs     Call MD for:  temperature >100.4     Call MD for:  persistent nausea and vomiting or diarrhea     Call MD for:  severe uncontrolled pain     Call MD for:  redness, tenderness, or signs of infection (pain, swelling, redness, odor or green/yellow discharge around incision site)     Call MD for:  difficulty breathing or increased cough     Call MD for:  severe persistent headache     Call MD for:  worsening rash     Call MD for:  persistent dizziness, light-headedness, or visual disturbances     Call MD for:  increased confusion or weakness     Change dressing (specify)   Order Comments: Dressing change: 1 times per day using gauze padding, and change dressings as needed.       Follow-up Information     Saqib Ty MD In 1 week.    Specialty:  Plastic Surgery  Contact information:  [address]  [address]RD FLO[address]  [facility] [address] [phone]

## 2017-08-22 NOTE — OP NOTE
DATE OF PROCEDURE:  08/22/2017    SURGEON:  Saqib Ty M.D.    ASSISTANT:  None.    PREOPERATIVE DIAGNOSIS:  Exposed left breast implant.    POSTOPERATIVE DIAGNOSIS:  Exposed left breast implant.    PROCEDURES:  Removal of left breast intact mammary implant.    ANESTHESIA:  MAC.    COMPLICATIONS:  None.    PROCEDURE IN DETAIL:  The patient was brought to the Operating Room.  She was   given preoperative antibiotics.  Following uneventful induction of general   anesthesia, she was prepped and draped in the usual sterile fashion.  A surgical   timeout was performed.    A 20 mL of 1% lidocaine with epinephrine were infiltrated into the planned   incisions.  Using a 10 blade, the multiple Swiss cheese appearance of the   inframammary fold was excised in its entirety.  This was taken down to the ADM   that was visible prior to the start of the case.  This was done as a breast   specimen.  The ADM was excised as much as possible with curved Faye scissors.    There was fluid in the pocket.  This was not purulent.  The intact tissue   expander was removed.  This was sent for gross pathology.  The pocket was   irrigated thoroughly.  A 1/4-inch Penrose was secured in place with 3-0 nylons.    The skin was closed with a single layer of interrupted 3-0 nylon sutures.    Dressings were applied consisting of 4 x 4 gauze as well as ABD pads.  These   were taped in place over the Penrose drain.  The patient tolerated the procedure   well and was awoken and brought to the PACU in stable condition.      AYLEEN/DAVE  dd: 08/22/2017 11:43:50 (CDT)  td: 08/22/2017 15:10:30 (CDT)  Doc ID   #[id number]  Job ID #[id number]    CC:

## 2017-08-22 NOTE — ANESTHESIA POSTPROCEDURE EVALUATION
"Anesthesia Post Evaluation    Patient: Susanne Hutton    Procedure(s) Performed: Procedure(s) (LRB):  REMOVAL-EXPANDER-TISSUE (Left)  DEBRIDEMENT-WOUND - DEBRIDEMENT OF LEFT BREAST (Left)    Final Anesthesia Type: MAC  Patient location during evaluation: [facility]  Patient participation: Yes- Able to Participate  Level of consciousness: awake and alert and awake  Post-procedure vital signs: reviewed and stable  Pain management: adequate  Airway patency: patent  PONV status at discharge: No PONV  Anesthetic complications: no      Cardiovascular status: blood pressure returned to baseline, hemodynamically stable and stable  Respiratory status: unassisted, spontaneous ventilation and room air  Hydration status: euvolemic  Follow-up not needed.        Visit Vitals  BP (!) 151/90 (BP Location: Right arm, Patient Position: Sitting)   Pulse 76   Temp 36.9 °C (98.5 °F) (Oral)   Resp 18   Ht 5' 5" (1.651 m)   Wt 71.7 kg (158 lb)   LMP 07/13/2017   SpO2 100%   Breastfeeding? No   BMI 26.29 kg/m²       Pain/Tiffany Score: Pain Assessment Performed: Yes (8/22/2017  9:55 AM)  Presence of Pain: denies (8/22/2017  9:55 AM)      "

## 2017-08-22 NOTE — PLAN OF CARE
Patient prefers to have Loren[facility] present for discharge teaching. Please contact them @[phone].

## 2017-09-17 PROBLEM — R53.83 FATIGUE: Status: ACTIVE | Noted: 2017-09-17

## 2017-09-17 PROBLEM — D64.9 SYMPTOMATIC ANEMIA: Status: ACTIVE | Noted: 2017-09-17

## 2017-09-18 PROBLEM — R06.02 SHORTNESS OF BREATH: Status: ACTIVE | Noted: 2017-09-18

## 2017-09-18 PROBLEM — K59.00 CONSTIPATION: Status: ACTIVE | Noted: 2017-09-18

## 2017-09-18 PROBLEM — D75.839 THROMBOCYTOSIS: Status: ACTIVE | Noted: 2017-09-18

## 2017-09-18 PROBLEM — I25.10 CORONARY ARTERY DISEASE INVOLVING NATIVE CORONARY ARTERY OF NATIVE HEART WITHOUT ANGINA PECTORIS: Status: ACTIVE | Noted: 2017-09-18

## 2017-09-19 ENCOUNTER — TELEPHONE (OUTPATIENT)
Dept: HEMATOLOGY/ONCOLOGY | Facility: CLINIC | Age: 37
End: 2017-09-19

## 2017-09-19 PROBLEM — R53.83 FATIGUE: Status: RESOLVED | Noted: 2017-09-17 | Resolved: 2017-09-19

## 2017-09-19 PROBLEM — K59.00 CONSTIPATION: Status: RESOLVED | Noted: 2017-09-18 | Resolved: 2017-09-19

## 2017-09-19 PROBLEM — E87.6 HYPOKALEMIA: Status: ACTIVE | Noted: 2017-09-19

## 2017-09-19 PROBLEM — R06.02 SHORTNESS OF BREATH: Status: RESOLVED | Noted: 2017-09-18 | Resolved: 2017-09-19

## 2017-09-19 NOTE — TELEPHONE ENCOUNTER
Returned call to patient. She stated that she is in the hospital because her blood counts are low and would like to schedule an appointment with Dr Bowser.     Appointment made and confirmed with patient.

## 2017-09-19 NOTE — TELEPHONE ENCOUNTER
----- Message from Rahel Encarnacion sent at 9/19/2017 12:53 PM CDT -----  Contact: self  Patient states in hospital in [place] at [facility].   Patient states platelet count too low.  Pt states very weak. Patient states need appointment for this Monday   Please call pt at [phone]

## 2017-10-02 ENCOUNTER — OFFICE VISIT (OUTPATIENT)
Dept: HEMATOLOGY/ONCOLOGY | Facility: CLINIC | Age: 37
End: 2017-10-02
Payer: MEDICAID

## 2017-10-02 ENCOUNTER — LAB VISIT (OUTPATIENT)
Dept: LAB | Facility: HOSPITAL | Age: 37
End: 2017-10-02
Attending: INTERNAL MEDICINE
Payer: MEDICAID

## 2017-10-02 VITALS
WEIGHT: 160.69 LBS | BODY MASS INDEX: 26.77 KG/M2 | TEMPERATURE: 98 F | HEIGHT: 65 IN | DIASTOLIC BLOOD PRESSURE: 77 MMHG | SYSTOLIC BLOOD PRESSURE: 158 MMHG | HEART RATE: 66 BPM

## 2017-10-02 DIAGNOSIS — D50.9 IRON DEFICIENCY ANEMIA: ICD-10-CM

## 2017-10-02 DIAGNOSIS — D50.0 IRON DEFICIENCY ANEMIA DUE TO CHRONIC BLOOD LOSS: Primary | ICD-10-CM

## 2017-10-02 LAB
BASOPHILS # BLD AUTO: 0.02 K/UL
BASOPHILS NFR BLD: 0.3 %
DIFFERENTIAL METHOD: ABNORMAL
EOSINOPHIL # BLD AUTO: 0.1 K/UL
EOSINOPHIL NFR BLD: 1.2 %
ERYTHROCYTE [DISTWIDTH] IN BLOOD BY AUTOMATED COUNT: 19.1 %
FERRITIN SERPL-MCNC: 15 NG/ML
HCT VFR BLD AUTO: 31.5 %
HGB BLD-MCNC: 8.9 G/DL
LYMPHOCYTES # BLD AUTO: 1.1 K/UL
LYMPHOCYTES NFR BLD: 14.5 %
MCH RBC QN AUTO: 20.4 PG
MCHC RBC AUTO-ENTMCNC: 28.3 G/DL
MCV RBC AUTO: 72 FL
MONOCYTES # BLD AUTO: 0.9 K/UL
MONOCYTES NFR BLD: 11.5 %
NEUTROPHILS # BLD AUTO: 5.5 K/UL
NEUTROPHILS NFR BLD: 72.2 %
PLATELET # BLD AUTO: 438 K/UL
PMV BLD AUTO: 10.4 FL
RBC # BLD AUTO: 4.36 M/UL
WBC # BLD AUTO: 7.58 K/UL

## 2017-10-02 PROCEDURE — 99999 PR PBB SHADOW E&M-EST. PATIENT-LVL III: CPT | Mod: PBBFAC,,, | Performed by: INTERNAL MEDICINE

## 2017-10-02 PROCEDURE — 3008F BODY MASS INDEX DOCD: CPT | Mod: ,,, | Performed by: INTERNAL MEDICINE

## 2017-10-02 PROCEDURE — 3077F SYST BP >= 140 MM HG: CPT | Mod: ,,, | Performed by: INTERNAL MEDICINE

## 2017-10-02 PROCEDURE — 3078F DIAST BP <80 MM HG: CPT | Mod: ,,, | Performed by: INTERNAL MEDICINE

## 2017-10-02 PROCEDURE — 36415 COLL VENOUS BLD VENIPUNCTURE: CPT

## 2017-10-02 PROCEDURE — 99215 OFFICE O/P EST HI 40 MIN: CPT | Mod: S$PBB,,, | Performed by: INTERNAL MEDICINE

## 2017-10-02 PROCEDURE — 85025 COMPLETE CBC W/AUTO DIFF WBC: CPT

## 2017-10-02 PROCEDURE — 82728 ASSAY OF FERRITIN: CPT

## 2017-10-02 PROCEDURE — 99213 OFFICE O/P EST LOW 20 MIN: CPT | Mod: PBBFAC | Performed by: INTERNAL MEDICINE

## 2017-10-02 NOTE — PROGRESS NOTES
Subjective:       Patient ID: Susanne Hutton is a 37 y.o. female.    Chief Complaint: Follow-up    Susanne presents for evaluation of a chronically elevated (7+ years) platelet count. She also has anemia and microcytosis for 7+ years. She eats cornstarch constantly since pregnancy. She continues to have regular, normal flow, menstrual cycles.  She does not have other clinical blood loss. Her delivery was traumatic due to emergent  and quantity of blood loss is unknown. Her cornstarch craving did resolve once with blood transfusion. She was recently prescribed oral iron tablets. She has regular heme iron in her diet.    Follow-up Visit   1 year since last evaluation.  Did not tolerate IV iron, passed out after first 10 minutes of infusion.  She continues to severely limit heme iron in diet  She is taking 3 iron supplement tablets daily      HPI  Review of Systems   Constitutional: Positive for fatigue.   HENT: Negative.    Eyes: Negative.    Respiratory: Negative.    Cardiovascular: Negative.    Endocrine: Negative.    Genitourinary: Positive for vaginal bleeding.   Musculoskeletal: Negative.    Skin: Negative.    Allergic/Immunologic: Negative for environmental allergies, food allergies and immunocompromised state.   Neurological: Negative.    Hematological: Negative for adenopathy. Does not bruise/bleed easily.   Psychiatric/Behavioral: Negative.        Objective:      Physical Exam   Constitutional: She is oriented to person, place, and time. She appears well-developed and well-nourished.   HENT:   Head: Normocephalic and atraumatic.   Right Ear: External ear normal.   Left Ear: External ear normal.   Nose: Nose normal.   Mouth/Throat: Oropharynx is clear and moist. No oropharyngeal exudate.   Eyes: Conjunctivae and EOM are normal. Pupils are equal, round, and reactive to light. No scleral icterus.   Neck: Normal range of motion. Neck supple.   Cardiovascular: Normal rate and intact distal  pulses.    Pulmonary/Chest: Effort normal. No respiratory distress.   Abdominal: Soft. She exhibits no distension. There is no tenderness.   Musculoskeletal: Normal range of motion. She exhibits no edema.   Neurological: She is alert and oriented to person, place, and time. No cranial nerve deficit.   Skin: Skin is warm and dry. No rash noted. No erythema. No pallor.   Psychiatric: She has a normal mood and affect. Her behavior is normal. Judgment and thought content normal.   Nursing note and vitals reviewed.      Assessment:       1. Iron deficiency anemia due to chronic blood loss        Plan:       Iron def anemia from multiple sources of loss- pregnancy + traumatic delivery + menstrual cycles.   Iron demand is greater than replacement.    Cannot give IV iron due to past severe reaction  Hemoglobin greater than 8, no indication for blood transfusion  Continue oral iron supplements  Instructed to eat heme iron daily\    Return visit with CBC and ferritin in 4 months

## 2017-11-28 PROBLEM — I25.2 HISTORY OF ST ELEVATION MYOCARDIAL INFARCTION (STEMI): Status: ACTIVE | Noted: 2017-07-25

## 2018-03-06 PROBLEM — F50.89 PICA IN ADULTS: Status: ACTIVE | Noted: 2018-03-06

## 2018-04-23 ENCOUNTER — INITIAL CONSULT (OUTPATIENT)
Dept: CARDIOLOGY | Facility: CLINIC | Age: 38
End: 2018-04-23
Payer: MEDICAID

## 2018-04-23 ENCOUNTER — INITIAL CONSULT (OUTPATIENT)
Dept: RADIATION ONCOLOGY | Facility: CLINIC | Age: 38
End: 2018-04-23
Payer: MEDICAID

## 2018-04-23 ENCOUNTER — DOCUMENTATION ONLY (OUTPATIENT)
Dept: CARDIOLOGY | Facility: CLINIC | Age: 38
End: 2018-04-23

## 2018-04-23 VITALS
RESPIRATION RATE: 20 BRPM | HEART RATE: 67 BPM | BODY MASS INDEX: 28.62 KG/M2 | WEIGHT: 172 LBS | DIASTOLIC BLOOD PRESSURE: 84 MMHG | SYSTOLIC BLOOD PRESSURE: 177 MMHG

## 2018-04-23 VITALS
OXYGEN SATURATION: 100 % | DIASTOLIC BLOOD PRESSURE: 84 MMHG | HEIGHT: 65 IN | HEART RATE: 67 BPM | WEIGHT: 172.63 LBS | BODY MASS INDEX: 28.76 KG/M2 | SYSTOLIC BLOOD PRESSURE: 177 MMHG

## 2018-04-23 DIAGNOSIS — R07.9 CHEST PAIN, UNSPECIFIED TYPE: ICD-10-CM

## 2018-04-23 DIAGNOSIS — I10 ESSENTIAL HYPERTENSION: ICD-10-CM

## 2018-04-23 DIAGNOSIS — I25.10 CORONARY ARTERY DISEASE DUE TO LIPID RICH PLAQUE: Primary | ICD-10-CM

## 2018-04-23 DIAGNOSIS — E78.00 HYPERCHOLESTEREMIA: ICD-10-CM

## 2018-04-23 DIAGNOSIS — I25.83 CORONARY ARTERY DISEASE DUE TO LIPID RICH PLAQUE: ICD-10-CM

## 2018-04-23 DIAGNOSIS — I25.10 CORONARY ARTERY DISEASE, ANGINA PRESENCE UNSPECIFIED, UNSPECIFIED VESSEL OR LESION TYPE, UNSPECIFIED WHETHER NATIVE OR TRANSPLANTED HEART: Primary | ICD-10-CM

## 2018-04-23 DIAGNOSIS — I25.83 CORONARY ARTERY DISEASE DUE TO LIPID RICH PLAQUE: Primary | ICD-10-CM

## 2018-04-23 DIAGNOSIS — I25.10 CORONARY ARTERY DISEASE DUE TO LIPID RICH PLAQUE: ICD-10-CM

## 2018-04-23 DIAGNOSIS — D75.839 THROMBOCYTOSIS: ICD-10-CM

## 2018-04-23 PROCEDURE — 99201 PR OFFICE/OUTPT VISIT,NEW,LEVL I: CPT | Mod: S$PBB,,, | Performed by: RADIOLOGY

## 2018-04-23 PROCEDURE — 99999 PR PBB SHADOW E&M-EST. PATIENT-LVL III: CPT | Mod: PBBFAC,,, | Performed by: RADIOLOGY

## 2018-04-23 PROCEDURE — 99213 OFFICE O/P EST LOW 20 MIN: CPT | Mod: PBBFAC,27 | Performed by: RADIOLOGY

## 2018-04-23 PROCEDURE — 99203 OFFICE O/P NEW LOW 30 MIN: CPT | Mod: S$PBB,,, | Performed by: INTERNAL MEDICINE

## 2018-04-23 PROCEDURE — 99214 OFFICE O/P EST MOD 30 MIN: CPT | Mod: PBBFAC,27 | Performed by: INTERNAL MEDICINE

## 2018-04-23 PROCEDURE — 99999 PR PBB SHADOW E&M-EST. PATIENT-LVL IV: CPT | Mod: PBBFAC,,, | Performed by: INTERNAL MEDICINE

## 2018-04-23 RX ORDER — DIPHENHYDRAMINE HCL 25 MG
50 CAPSULE ORAL EVERY 6 HOURS
Status: CANCELLED | OUTPATIENT
Start: 2018-04-23 | End: 2018-04-24

## 2018-04-23 RX ORDER — SODIUM CHLORIDE 9 MG/ML
3 INJECTION, SOLUTION INTRAVENOUS CONTINUOUS
Status: CANCELLED | OUTPATIENT
Start: 2018-04-23 | End: 2018-04-23

## 2018-04-23 NOTE — PROGRESS NOTES
Subjective:    Patient ID:  Susanne Hutton is a 38 y.o. female who presents for follow-up of CAD    Ms Hutton is a 38 year old female patient referred by Dr Salvador for brachytherapy. PMH of HTN, breast cancer S/p mastectomy and LN resection. She had an ant STEMI in July 2017. Patient continues to have periodic chest discomfort and she underwent a repeat LHC on 3/20/18. She had ISR of the mLAD and a de herb obstructive lesion distal to the previous stent. The ISR was treated with a balloon angioplasty and a EMANUEL was implanted distal to the previous stent. Patient reports taking 1 or 2 sublingual nitroglycerine for her recurrent symptoms that began approximately 3 days after her last PCI.. he denies any bleeding complication secondary to Brilinta. She denies LE edema, orthopnea, or ND.  She denies palpitations, syncope, or near syncope.       Past Medical History:   Diagnosis Date    Anemia     Anxiety     Cancer     breast left    Coronary artery disease     Hypertension     MI (myocardial infarction) 07/2017     Past Surgical History:   Procedure Laterality Date    BREAST LUMPECTOMY W/ NEEDLE LOCALIZATION Left 08/2016    benign    BREAST RECONSTRUCTION      BREAST SURGERY  2009    left mastectomy with implant reconstruction    CARDIAC SURGERY      coronary stent    CORONARY ANGIOPLASTY WITH STENT PLACEMENT      7/25/2017    diagnostic laparoscopy  2013    MASTECTOMY      left    OTHER SURGICAL HISTORY  VATS    Mediatinal node biopsy    tissue expansion Left     TUMOR REMOVAL  2010    lung-benign     Current Outpatient Prescriptions on File Prior to Visit   Medication Sig Dispense Refill    amLODIPine (NORVASC) 5 MG tablet Take 1 tablet (5 mg total) by mouth once daily. (Patient taking differently: Take 10 mg by mouth once daily. ) 30 tablet 11    aspirin (ECOTRIN) 81 MG EC tablet Take 1 tablet (81 mg total) by mouth once daily.  0    atorvastatin (LIPITOR) 40 MG tablet Take 1 tablet (40 mg  total) by mouth every evening. 90 tablet 3    CYANOCOBALAMIN, VITAMIN B-12, (VITAMIN B-12 ORAL) Take 1 tablet by mouth 2 (two) times daily.       FERROUS SULFATE (IRON ORAL) Take 1 tablet by mouth 2 (two) times daily.       lisinopril (PRINIVIL,ZESTRIL) 40 MG tablet Take 1 tablet (40 mg total) by mouth once daily. (Patient taking differently: Take 10 mg by mouth once daily. ) 90 tablet 3    metoprolol succinate (TOPROL-XL) 100 MG 24 hr tablet Take 1 tablet by mouth once daily.      nitroGLYCERIN (NITROSTAT) 0.4 MG SL tablet Place 0.4 mg under the tongue every 5 (five) minutes as needed for Chest pain.      ondansetron (ZOFRAN-ODT) 4 MG TbDL Take 1 tablet (4 mg total) by mouth every 6 (six) hours as needed. 30 tablet 0    spironolactone-hydrochlorothiazide 25-25mg (ALDACTAZIDE) 25-25 mg Tab Take 1 tablet by mouth once daily. 30 tablet 11    ticagrelor (BRILINTA) 90 mg tablet Take 1 tablet (90 mg total) by mouth 2 (two) times daily. 180 tablet 3     No current facility-administered medications on file prior to visit.      Review of patient's allergies indicates:   Allergen Reactions    Feraheme [ferumoxytol] Anaphylaxis       Social History     Social History    Marital status: Single     Spouse name: N/A    Number of children: N/A    Years of education: N/A     Occupational History    Not on file.     Social History Main Topics    Smoking status: Never Smoker    Smokeless tobacco: Never Used    Alcohol use No    Drug use: Unknown    Sexual activity: Not on file     Other Topics Concern    Not on file     Social History Narrative    No narrative on file     Family History   Problem Relation Age of Onset    Hypertension Mother     Cancer Mother      thyroid     Review of Systems   Constitution: Negative for decreased appetite and weight gain.   HENT: Negative for congestion and sore throat.    Eyes: Negative for blurred vision and visual disturbance.   Cardiovascular: Positive for chest pain.  "  Respiratory: Negative for shortness of breath.    Endocrine: Negative.    Skin: Negative.    Musculoskeletal: Negative for arthritis.   Gastrointestinal: Negative for abdominal pain and heartburn.   Genitourinary: Negative.    Neurological: Negative for excessive daytime sleepiness and light-headedness.   Psychiatric/Behavioral: Negative.            Objective:    Physical Exam   Constitutional: She is oriented to person, place, and time. She appears well-developed and well-nourished.   HENT:   Head: Normocephalic and atraumatic.   Eyes: Conjunctivae are normal. Pupils are equal, round, and reactive to light. Right eye exhibits no discharge.   Neck: Normal range of motion. Neck supple. No JVD present. No thyromegaly present.   Cardiovascular: Normal rate and regular rhythm.    No murmur heard.  Pulses:       Carotid pulses are 2+ on the right side, and 2+ on the left side.       Radial pulses are 2+ on the right side, and 2+ on the left side.        Femoral pulses are 2+ on the right side, and 2+ on the left side.       Dorsalis pedis pulses are 2+ on the right side, and 2+ on the left side.        Posterior tibial pulses are 2+ on the right side, and 2+ on the left side.   Right Allens normal   Pulmonary/Chest: Effort normal and breath sounds normal. No respiratory distress.   Abdominal: Soft. Bowel sounds are normal. She exhibits no distension. There is no tenderness.   Musculoskeletal: Normal range of motion. She exhibits no edema or deformity.   Neurological: She is alert and oriented to person, place, and time. No cranial nerve deficit.   Skin: Skin is warm and dry. No erythema.   Psychiatric: She has a normal mood and affect. Her behavior is normal.   Vitals reviewed.    BP (!) 177/84 (BP Location: Right arm, Patient Position: Sitting, BP Method: Large (Automatic))   Pulse 67   Ht 5' 5" (1.651 m)   Wt 78.3 kg (172 lb 9.9 oz)   SpO2 100%   BMI 28.73 kg/m²     Left arm BP not measured - mastectomy    TTE " 9/19/17    CONCLUSIONS     1 - Normal left ventricular systolic function (EF 60-65%).     2 - No wall motion abnormalities.     3 - Normal left ventricular diastolic function.     4 - Normal right ventricular systolic function .     5 - The estimated RV systolic pressure is 29 mmHg.     6 - Trivial mitral regurgitation.     7 - Trivial tricuspid regurgitation.   3/18/18 LHC    --  Right coronary angiography.  --  Left coronary angiography.  --  Intervention on mid LAD: drug-eluting stent.    Assessment:       1. Coronary artery disease, s/p stent to mLAD EMANUEL 3.0 x 12 on 3/18/18    2. History of ST elevation myocardial infarction (STEMI) LAD    3. Essential hypertension    4. Hypercholesteremia    5. Iron deficiency anemia due to chronic blood loss        Plan:       38 year old female patient with CAD having recurrent anginal symptoms - had ISR of mLAD S/p Balloon angioplasty(at [facility])  referred for brachytherapy.    1.         Plan Cardiac catheterization with balloon angioplasty and Brachytherapy. Consult radiation Oncology.  2.         Antiplatelets: ASA and Brilinta    3.         Access: Right Radial  4.         Catheters: Angelo  5.         Pt is a EMANUEL candidate and understands the importance of taking Brilinta for at least one year, understands that in case of receiving a drug coated stent the failure to comply with dual anti-platelet therapy is likely to result in stent clothing, heart attack and death.   6.         The risks, benefits, and alternatives of coronary vascular angiography and possible intervention were discussed with the patient. All questions were answered and informed consent was obtained. I had a detailed discussion with the patient regarding risk of stroke, MI, bleeding access site complications including limb loss, allergy, kidney failure including dialysis and death.  7.         The patient understands the risks and benefits and wishes to go ahead with the  procedure.  8.         All patient's questions were answered    HTN: uncontrolled. On Amlodipine, lisinopril, Toprol XL. Reassess after cardiac cath  .  Chronic microcytic anemia.on iron supplementations. Was allergic to IV formulation of Iron. Never had a colonoscopy. Defer to PCP for work up.    HLD: well controlled LDL 62. On lipitor 40 mgs.    Jordan fine MD  [phone]    I have personally taken the history and examined this patient and agree with the resident's note as stated above.  All of the patient's questions were answered.

## 2018-04-23 NOTE — LETTER
April 24, 2018      Fátima Salvador MD  [address]  [address]  [facility] [address]           [facility]  [address]  [facility] [address]  Phone: [phone]          Patient: Susanne Hutton   MR Number: [id number]   YOB: 1980   Date of Visit: 4/23/2018       Dear Dr. Fátima Salvador:    Thank you for referring Susanne Hutton to me for evaluation. Attached you will find relevant portions of my assessment and plan of care.    If you have questions, please do not hesitate to call me. I look forward to following Susanne Hutton along with you.    Sincerely,    Rajesh Oliver MD    Enclosure  CC:  No Recipients    If you would like to receive this communication electronically, please contact [email or web address] or [phone] to request more information on [employer] access.    For providers and/or their staff who would like to refer a patient to [facility], please contact us through our one-stop-shop provider referral line, [facility], at [phone].    If you feel you have received this communication in error or would no longer like to receive these types of communications, please e-mail [email or web address]

## 2018-04-23 NOTE — LETTER
April 23, 2018      Luz Oliver MD  [address] [facility] [id number]           [facility] - Radiation Oncology  [address]  [facility] [address]  Phone: [phone]          Patient: Susanne Hutton   MR Number: [id number]   YOB: 1980   Date of Visit: 4/23/2018       Dear Dr. Luz Oliver:    Thank you for referring Susanne Hutton to me for evaluation. Attached you will find relevant portions of my assessment and plan of care.    If you have questions, please do not hesitate to call me. I look forward to following Susanne Hutton along with you.    Sincerely,    George Zapien MD    Enclosure  CC:  No Recipients    If you would like to receive this communication electronically, please contact [email or web address] or [phone] to request more information on [employer] access.    For providers and/or their staff who would like to refer a patient to [facility], please contact us through our one-stop-shop provider referral line, [facility], at [phone].    If you feel you have received this communication in error or would no longer like to receive these types of communications, please e-mail [email or web address]

## 2018-04-23 NOTE — PROGRESS NOTES
REFERRING PHYSICIAN: Luz Oliver MD    PROBLEM: Ms Hutton is a 38 years old woman with restenosis of a stented coronary artery who may benefit from endovascular irradiation at the time of planned percutaneous angioplasty procedure on 5/2/18.     The expected benefit and risk of the endovascular irradiation were discussed with patient. She understands and signs the consent.

## 2018-04-23 NOTE — PROGRESS NOTES
OUTPATIENT CATHETERIZATION INSTRUCTIONS    You have been scheduled for a procedure in the catheterization lab on Tuesday, May 1,  2018.     Please report to the Cardiology Waiting Area on the Third floor of the hospital and check in at 10 AM.   You will then be taken to the SSCU (Short Stay Cardiac Unit) and prepared for your procedure. Please be aware that this is not the time of your procedure but the time you are to arrive. The procedures are scheduled on an hourly basis; however, emergency cases take precedence over all other cases.       You may not have anything to eat or drink after midnight the night before your test. You may take your regular morning medications with water. If there are any medications that you should not take you will be instructed to hold them that morning. If you are diabetic and on Metformin (Glucophage) do not take it the day before, the day of, and for 2 days after your procedure.      The procedure will take 1-2 hours to perform. After the procedure, you will return to SSCU on the third floor of the hospital. You will need to lie still (or keep your arm still) for the next 4 to 6 hours to minimize bleeding from the puncture site. Your family may remain in the room with you during this time.       You may be able to be discharged home that same afternoon if there is someone to drive you home and there were no complications. If you have one of the balloon, stent, or device procedures you may spend the night in the hospital. Your doctor will determine, based on your progress, the date and time of your discharge. The results of your procedure will be discussed with you before you are discharged. Any further testing or procedures will be scheduled for you either before you leave or you will be called with these appointments.       If you should have any questions, concerns, or need to change the date of your procedure, please call ELSY Stahl @ [phone]    Special  Instructions:    None        THE ABOVE INSTRUCTIONS WERE GIVEN TO THE PATIENT VERBALLY AND THEY VERBALIZED UNDERSTANDING.  THEY DO NOT REQUIRE ANY SPECIAL NEEDS AND DO NOT HAVE ANY LEARNING BARRIERS.          Directions for Reporting to Cardiology Waiting Area in the Hospital  If you park in the Parking Garage:  Take elevators to the1st floor of the parking garage.  Continue past the gift shop, coffee shop, and piano.  Take a right and go to the gold elevators. (Elevator B)  Take the elevator to the 3rd floor.  Follow the arrow on the sign on the wall that says Cath Lab Registration/EP Lab Registration.  Follow the long hallway all the way around until you come to a big open area.  This is the registration area.  Check in at Reception Desk.    OR    If family is dropping you off:  Have them drop you off at the front of the Hospital under the green overhang.  Enter through the doors and take a right.  Take the E elevators to the 3rd floor Cardiology Waiting Area.  Check in at the Reception Desk in the waiting room.

## 2018-05-01 ENCOUNTER — HOSPITAL ENCOUNTER (OUTPATIENT)
Facility: HOSPITAL | Age: 38
Discharge: HOME | End: 2018-05-02
Attending: INTERNAL MEDICINE | Admitting: INTERNAL MEDICINE
Payer: MEDICAID

## 2018-05-01 DIAGNOSIS — C50.919 MALIGNANT NEOPLASM OF FEMALE BREAST: ICD-10-CM

## 2018-05-01 DIAGNOSIS — I25.10 CORONARY ARTERY DISEASE, ANGINA PRESENCE UNSPECIFIED, UNSPECIFIED VESSEL OR LESION TYPE, UNSPECIFIED WHETHER NATIVE OR TRANSPLANTED HEART: ICD-10-CM

## 2018-05-01 LAB
ABO + RH BLD: NORMAL
ANION GAP SERPL CALC-SCNC: 7 MMOL/L
B-HCG UR QL: NEGATIVE
BASOPHILS # BLD AUTO: 0.03 K/UL
BASOPHILS NFR BLD: 0.4 %
BLD GP AB SCN CELLS X3 SERPL QL: NORMAL
BUN SERPL-MCNC: 13 MG/DL
CALCIUM SERPL-MCNC: 8.9 MG/DL
CHLORIDE SERPL-SCNC: 108 MMOL/L
CO2 SERPL-SCNC: 24 MMOL/L
CORONARY STENOSIS: ABNORMAL
CREAT SERPL-MCNC: 0.9 MG/DL
CTP QC/QA: YES
DIFFERENTIAL METHOD: ABNORMAL
EOSINOPHIL # BLD AUTO: 0.2 K/UL
EOSINOPHIL NFR BLD: 2.7 %
ERYTHROCYTE [DISTWIDTH] IN BLOOD BY AUTOMATED COUNT: 16.8 %
EST. GFR  (AFRICAN AMERICAN): >60 ML/MIN/1.73 M^2
EST. GFR  (NON AFRICAN AMERICAN): >60 ML/MIN/1.73 M^2
GIANT PLATELETS BLD QL SMEAR: PRESENT
GLUCOSE SERPL-MCNC: 75 MG/DL
HCT VFR BLD AUTO: 30.5 %
HGB BLD-MCNC: 8.3 G/DL
IMM GRANULOCYTES # BLD AUTO: 0.06 K/UL
IMM GRANULOCYTES NFR BLD AUTO: 0.7 %
LYMPHOCYTES # BLD AUTO: 1.6 K/UL
LYMPHOCYTES NFR BLD: 19 %
MCH RBC QN AUTO: 19.1 PG
MCHC RBC AUTO-ENTMCNC: 27.2 G/DL
MCV RBC AUTO: 70 FL
MONOCYTES # BLD AUTO: 0.9 K/UL
MONOCYTES NFR BLD: 10.3 %
NEUTROPHILS # BLD AUTO: 5.6 K/UL
NEUTROPHILS NFR BLD: 66.9 %
NRBC BLD-RTO: 0 /100 WBC
PLATELET # BLD AUTO: 527 K/UL
PLATELET BLD QL SMEAR: ABNORMAL
PMV BLD AUTO: 10.6 FL
POTASSIUM SERPL-SCNC: 5.1 MMOL/L
RBC # BLD AUTO: 4.35 M/UL
SODIUM SERPL-SCNC: 139 MMOL/L
WBC # BLD AUTO: 8.42 K/UL

## 2018-05-01 PROCEDURE — 80048 BASIC METABOLIC PNL TOTAL CA: CPT

## 2018-05-01 PROCEDURE — 86850 RBC ANTIBODY SCREEN: CPT

## 2018-05-01 PROCEDURE — C1887 CATHETER, GUIDING: HCPCS

## 2018-05-01 PROCEDURE — 93458 L HRT ARTERY/VENTRICLE ANGIO: CPT | Mod: 59

## 2018-05-01 PROCEDURE — 25000003 PHARM REV CODE 250: Performed by: STUDENT IN AN ORGANIZED HEALTH CARE EDUCATION/TRAINING PROGRAM

## 2018-05-01 PROCEDURE — 99152 MOD SED SAME PHYS/QHP 5/>YRS: CPT

## 2018-05-01 PROCEDURE — 99152 MOD SED SAME PHYS/QHP 5/>YRS: CPT | Mod: ,,, | Performed by: INTERNAL MEDICINE

## 2018-05-01 PROCEDURE — 36415 COLL VENOUS BLD VENIPUNCTURE: CPT

## 2018-05-01 PROCEDURE — 92920 PRQ TRLUML C ANGIOP 1ART&/BR: CPT | Mod: LD,58,, | Performed by: INTERNAL MEDICINE

## 2018-05-01 PROCEDURE — 81025 URINE PREGNANCY TEST: CPT | Performed by: INTERNAL MEDICINE

## 2018-05-01 PROCEDURE — 63600175 PHARM REV CODE 636 W HCPCS

## 2018-05-01 PROCEDURE — 85025 COMPLETE CBC W/AUTO DIFF WBC: CPT

## 2018-05-01 PROCEDURE — 93005 ELECTROCARDIOGRAM TRACING: CPT | Mod: 59

## 2018-05-01 PROCEDURE — 93458 L HRT ARTERY/VENTRICLE ANGIO: CPT | Mod: 26,59,58, | Performed by: INTERNAL MEDICINE

## 2018-05-01 PROCEDURE — 25000003 PHARM REV CODE 250

## 2018-05-01 PROCEDURE — 92974 CATH PLACE CARDIO BRACHYTX: CPT | Mod: ,,, | Performed by: INTERNAL MEDICINE

## 2018-05-01 PROCEDURE — 25000003 PHARM REV CODE 250: Performed by: INTERNAL MEDICINE

## 2018-05-01 PROCEDURE — 92978 ENDOLUMINL IVUS OCT C 1ST: CPT | Mod: 26,,, | Performed by: INTERNAL MEDICINE

## 2018-05-01 PROCEDURE — 93010 ELECTROCARDIOGRAM REPORT: CPT | Mod: ,,, | Performed by: INTERNAL MEDICINE

## 2018-05-01 RX ORDER — METOPROLOL SUCCINATE 100 MG/1
100 TABLET, EXTENDED RELEASE ORAL DAILY
Status: DISCONTINUED | OUTPATIENT
Start: 2018-05-02 | End: 2018-05-02 | Stop reason: HOSPADM

## 2018-05-01 RX ORDER — SPIRONOLACTONE 25 MG/1
25 TABLET ORAL DAILY
Status: DISCONTINUED | OUTPATIENT
Start: 2018-05-02 | End: 2018-05-02 | Stop reason: HOSPADM

## 2018-05-01 RX ORDER — SPIRONOLACTONE AND HYDROCHLOROTHIAZIDE 25; 25 MG/1; MG/1
1 TABLET ORAL DAILY
Status: DISCONTINUED | OUTPATIENT
Start: 2018-05-02 | End: 2018-05-01 | Stop reason: RX

## 2018-05-01 RX ORDER — DIPHENHYDRAMINE HCL 50 MG
50 CAPSULE ORAL EVERY 6 HOURS
Status: DISCONTINUED | OUTPATIENT
Start: 2018-05-01 | End: 2018-05-01 | Stop reason: HOSPADM

## 2018-05-01 RX ORDER — ASPIRIN 81 MG/1
81 TABLET ORAL DAILY
Status: DISCONTINUED | OUTPATIENT
Start: 2018-05-02 | End: 2018-05-02 | Stop reason: HOSPADM

## 2018-05-01 RX ORDER — ATORVASTATIN CALCIUM 20 MG/1
40 TABLET, FILM COATED ORAL NIGHTLY
Status: DISCONTINUED | OUTPATIENT
Start: 2018-05-01 | End: 2018-05-02 | Stop reason: HOSPADM

## 2018-05-01 RX ORDER — LISINOPRIL 10 MG/1
10 TABLET ORAL DAILY
Status: DISCONTINUED | OUTPATIENT
Start: 2018-05-02 | End: 2018-05-02 | Stop reason: HOSPADM

## 2018-05-01 RX ORDER — TRAMADOL HYDROCHLORIDE 50 MG/1
50 TABLET ORAL ONCE
Status: COMPLETED | OUTPATIENT
Start: 2018-05-01 | End: 2018-05-01

## 2018-05-01 RX ORDER — HYDROCHLOROTHIAZIDE 25 MG/1
25 TABLET ORAL DAILY
Status: DISCONTINUED | OUTPATIENT
Start: 2018-05-02 | End: 2018-05-02 | Stop reason: HOSPADM

## 2018-05-01 RX ORDER — AMLODIPINE BESYLATE 5 MG/1
5 TABLET ORAL DAILY
Status: DISCONTINUED | OUTPATIENT
Start: 2018-05-02 | End: 2018-05-02 | Stop reason: HOSPADM

## 2018-05-01 RX ORDER — SODIUM CHLORIDE 9 MG/ML
3 INJECTION, SOLUTION INTRAVENOUS CONTINUOUS
Status: ACTIVE | OUTPATIENT
Start: 2018-05-01 | End: 2018-05-01

## 2018-05-01 RX ADMIN — TICAGRELOR 90 MG: 90 TABLET ORAL at 08:05

## 2018-05-01 RX ADMIN — SODIUM CHLORIDE 3 ML/KG/HR: 0.9 INJECTION, SOLUTION INTRAVENOUS at 10:05

## 2018-05-01 RX ADMIN — ATORVASTATIN CALCIUM 40 MG: 20 TABLET, FILM COATED ORAL at 08:05

## 2018-05-01 RX ADMIN — SODIUM CHLORIDE 1.5 ML/KG/HR: 0.9 INJECTION, SOLUTION INTRAVENOUS at 04:05

## 2018-05-01 RX ADMIN — DIPHENHYDRAMINE HYDROCHLORIDE 50 MG: 50 CAPSULE ORAL at 10:05

## 2018-05-01 RX ADMIN — TRAMADOL HYDROCHLORIDE 50 MG: 50 TABLET, FILM COATED ORAL at 06:05

## 2018-05-01 NOTE — INTERVAL H&P NOTE
The patient has been examined and the H&P has been reviewed:    I concur with the findings and no changes have occurred since H&P was written.    Anesthesia/Surgery risks, benefits and alternative options discussed and understood by patient/family.          Active Hospital Problems    Diagnosis  POA    Coronary artery disease [I25.10]  Yes      Resolved Hospital Problems    Diagnosis Date Resolved POA   No resolved problems to display.

## 2018-05-01 NOTE — BRIEF OP NOTE
"Post Cath Progress Note      Subjective:   Patient has no complaints. The patient denies chest pain, shortness of breath, back pain, or leg pain.      Objective:   Physical Exam   BP (!) 157/82 (BP Location: Right arm, Patient Position: Lying)   Pulse 65   Temp 97.9 °F (36.6 °C) (Oral)   Resp 18   Ht 5' 5" (1.651 m)   Wt 78 kg (172 lb)   LMP 05/01/2018   SpO2 100%   Breastfeeding? No   BMI 28.62 kg/m²    General: alert, awake and oriented, not in distress.  Neck: No JVD present.   Cardiovascular: Normal rate, regular rhythm, normal heart sounds and intact distal pulses.  Exam reveals no gallop and no friction rub. No murmur heard.  Pulmonary/Chest: Effort normal and breath sounds normal. No respiratory distress. He has no wheezes. He has no rales.   Abdominal: Soft. Bowel sounds are normal. He exhibits no distension. There is no tenderness. There is no rebound and no guarding.   Musculoskeletal: He no lower extremity edema. No Groin swelling or hematoma.  PULSES: The right radial pulse was 2+. The left radial pulse was 2+. The right femoral pulse was 2+. The left femoral pulse was 2+. The right post tibial pulse was 2+. The left post tibial pulse was 2+. The right dorsalis pedis pulse was 2+. The left dorsalis pedis pulse was 2+. No evidence of embolic phenomena or s/s of limb ischemia.       Assesment:  Susanne Hutton is a 38 y.o. female who presented to the cardiac short stay unit for elective LHC. The patient is s/p LHC + POBA of mLAD followed by brachytherapy. Access obtained via Right radial. LVEDP 22.    Plan/Recs:  - Routine post-cath care  - IVF according to [facility] protocol for 4 hrs at 1.5 cc/kg/hr  - Cardiac rehab referral  - Continue medical management  - Aspirin 81 mg indefinitely and ticagrelor for at least 1 year  - will monitor overnight for complications  - Full cath report to follow    Meg Santana MD.  Cardiology Fellow  Pager: [phone]  05/01/2018 - 3:58 PM      "

## 2018-05-01 NOTE — H&P (VIEW-ONLY)
Subjective:    Patient ID:  Susanne Hutton is a 38 y.o. female who presents for follow-up of CAD    Ms Hutton is a 38 year old female patient referred by Dr Salvador for brachytherapy. PMH of HTN, breast cancer S/p mastectomy and LN resection. She had an ant STEMI in July 2017. Patient continues to have periodic chest discomfort and she underwent a repeat LHC on 3/20/18. She had ISR of the mLAD and a de herb obstructive lesion distal to the previous stent. The ISR was treated with a balloon angioplasty and a EMANUEL was implanted distal to the previous stent. Patient reports taking 1 or 2 sublingual nitroglycerine for her recurrent symptoms that began approximately 3 days after her last PCI.. he denies any bleeding complication secondary to Brilinta. She denies LE edema, orthopnea, or ND.  She denies palpitations, syncope, or near syncope.       Past Medical History:   Diagnosis Date    Anemia     Anxiety     Cancer     breast left    Coronary artery disease     Hypertension     MI (myocardial infarction) 07/2017     Past Surgical History:   Procedure Laterality Date    BREAST LUMPECTOMY W/ NEEDLE LOCALIZATION Left 08/2016    benign    BREAST RECONSTRUCTION      BREAST SURGERY  2009    left mastectomy with implant reconstruction    CARDIAC SURGERY      coronary stent    CORONARY ANGIOPLASTY WITH STENT PLACEMENT      7/25/2017    diagnostic laparoscopy  2013    MASTECTOMY      left    OTHER SURGICAL HISTORY  VATS    Mediatinal node biopsy    tissue expansion Left     TUMOR REMOVAL  2010    lung-benign     Current Outpatient Prescriptions on File Prior to Visit   Medication Sig Dispense Refill    amLODIPine (NORVASC) 5 MG tablet Take 1 tablet (5 mg total) by mouth once daily. (Patient taking differently: Take 10 mg by mouth once daily. ) 30 tablet 11    aspirin (ECOTRIN) 81 MG EC tablet Take 1 tablet (81 mg total) by mouth once daily.  0    atorvastatin (LIPITOR) 40 MG tablet Take 1 tablet (40 mg  total) by mouth every evening. 90 tablet 3    CYANOCOBALAMIN, VITAMIN B-12, (VITAMIN B-12 ORAL) Take 1 tablet by mouth 2 (two) times daily.       FERROUS SULFATE (IRON ORAL) Take 1 tablet by mouth 2 (two) times daily.       lisinopril (PRINIVIL,ZESTRIL) 40 MG tablet Take 1 tablet (40 mg total) by mouth once daily. (Patient taking differently: Take 10 mg by mouth once daily. ) 90 tablet 3    metoprolol succinate (TOPROL-XL) 100 MG 24 hr tablet Take 1 tablet by mouth once daily.      nitroGLYCERIN (NITROSTAT) 0.4 MG SL tablet Place 0.4 mg under the tongue every 5 (five) minutes as needed for Chest pain.      ondansetron (ZOFRAN-ODT) 4 MG TbDL Take 1 tablet (4 mg total) by mouth every 6 (six) hours as needed. 30 tablet 0    spironolactone-hydrochlorothiazide 25-25mg (ALDACTAZIDE) 25-25 mg Tab Take 1 tablet by mouth once daily. 30 tablet 11    ticagrelor (BRILINTA) 90 mg tablet Take 1 tablet (90 mg total) by mouth 2 (two) times daily. 180 tablet 3     No current facility-administered medications on file prior to visit.      Review of patient's allergies indicates:   Allergen Reactions    Feraheme [ferumoxytol] Anaphylaxis       Social History     Social History    Marital status: Single     Spouse name: N/A    Number of children: N/A    Years of education: N/A     Occupational History    Not on file.     Social History Main Topics    Smoking status: Never Smoker    Smokeless tobacco: Never Used    Alcohol use No    Drug use: Unknown    Sexual activity: Not on file     Other Topics Concern    Not on file     Social History Narrative    No narrative on file     Family History   Problem Relation Age of Onset    Hypertension Mother     Cancer Mother      thyroid     Review of Systems   Constitution: Negative for decreased appetite and weight gain.   HENT: Negative for congestion and sore throat.    Eyes: Negative for blurred vision and visual disturbance.   Cardiovascular: Positive for chest pain.  "  Respiratory: Negative for shortness of breath.    Endocrine: Negative.    Skin: Negative.    Musculoskeletal: Negative for arthritis.   Gastrointestinal: Negative for abdominal pain and heartburn.   Genitourinary: Negative.    Neurological: Negative for excessive daytime sleepiness and light-headedness.   Psychiatric/Behavioral: Negative.            Objective:    Physical Exam   Constitutional: She is oriented to person, place, and time. She appears well-developed and well-nourished.   HENT:   Head: Normocephalic and atraumatic.   Eyes: Conjunctivae are normal. Pupils are equal, round, and reactive to light. Right eye exhibits no discharge.   Neck: Normal range of motion. Neck supple. No JVD present. No thyromegaly present.   Cardiovascular: Normal rate and regular rhythm.    No murmur heard.  Pulses:       Carotid pulses are 2+ on the right side, and 2+ on the left side.       Radial pulses are 2+ on the right side, and 2+ on the left side.        Femoral pulses are 2+ on the right side, and 2+ on the left side.       Dorsalis pedis pulses are 2+ on the right side, and 2+ on the left side.        Posterior tibial pulses are 2+ on the right side, and 2+ on the left side.   Right Allens normal   Pulmonary/Chest: Effort normal and breath sounds normal. No respiratory distress.   Abdominal: Soft. Bowel sounds are normal. She exhibits no distension. There is no tenderness.   Musculoskeletal: Normal range of motion. She exhibits no edema or deformity.   Neurological: She is alert and oriented to person, place, and time. No cranial nerve deficit.   Skin: Skin is warm and dry. No erythema.   Psychiatric: She has a normal mood and affect. Her behavior is normal.   Vitals reviewed.    BP (!) 177/84 (BP Location: Right arm, Patient Position: Sitting, BP Method: Large (Automatic))   Pulse 67   Ht 5' 5" (1.651 m)   Wt 78.3 kg (172 lb 9.9 oz)   SpO2 100%   BMI 28.73 kg/m²     Left arm BP not measured - mastectomy    TTE " 9/19/17    CONCLUSIONS     1 - Normal left ventricular systolic function (EF 60-65%).     2 - No wall motion abnormalities.     3 - Normal left ventricular diastolic function.     4 - Normal right ventricular systolic function .     5 - The estimated RV systolic pressure is 29 mmHg.     6 - Trivial mitral regurgitation.     7 - Trivial tricuspid regurgitation.   3/18/18 LHC    --  Right coronary angiography.  --  Left coronary angiography.  --  Intervention on mid LAD: drug-eluting stent.    Assessment:       1. Coronary artery disease, s/p stent to mLAD EMANUEL 3.0 x 12 on 3/18/18    2. History of ST elevation myocardial infarction (STEMI) LAD    3. Essential hypertension    4. Hypercholesteremia    5. Iron deficiency anemia due to chronic blood loss        Plan:       38 year old female patient with CAD having recurrent anginal symptoms - had ISR of mLAD S/p Balloon angioplasty(at [facility])  referred for brachytherapy.    1.         Plan Cardiac catheterization with balloon angioplasty and Brachytherapy. Consult radiation Oncology.  2.         Antiplatelets: ASA and Brilinta    3.         Access: Right Radial  4.         Catheters: Angelo  5.         Pt is a EMANUEL candidate and understands the importance of taking Brilinta for at least one year, understands that in case of receiving a drug coated stent the failure to comply with dual anti-platelet therapy is likely to result in stent clothing, heart attack and death.   6.         The risks, benefits, and alternatives of coronary vascular angiography and possible intervention were discussed with the patient. All questions were answered and informed consent was obtained. I had a detailed discussion with the patient regarding risk of stroke, MI, bleeding access site complications including limb loss, allergy, kidney failure including dialysis and death.  7.         The patient understands the risks and benefits and wishes to go ahead with the  procedure.  8.         All patient's questions were answered    HTN: uncontrolled. On Amlodipine, lisinopril, Toprol XL. Reassess after cardiac cath  .  Chronic microcytic anemia.on iron supplementations. Was allergic to IV formulation of Iron. Never had a colonoscopy. Defer to PCP for work up.    HLD: well controlled LDL 62. On lipitor 40 mgs.    Jordan fine MD  [phone]    I have personally taken the history and examined this patient and agree with the resident's note as stated above.  All of the patient's questions were answered.

## 2018-05-02 VITALS
OXYGEN SATURATION: 99 % | DIASTOLIC BLOOD PRESSURE: 91 MMHG | RESPIRATION RATE: 18 BRPM | BODY MASS INDEX: 28.66 KG/M2 | SYSTOLIC BLOOD PRESSURE: 156 MMHG | HEIGHT: 65 IN | TEMPERATURE: 99 F | HEART RATE: 66 BPM | WEIGHT: 172 LBS

## 2018-05-02 LAB
ANION GAP SERPL CALC-SCNC: 7 MMOL/L
ANISOCYTOSIS BLD QL SMEAR: SLIGHT
BASOPHILS # BLD AUTO: 0.03 K/UL
BASOPHILS # BLD AUTO: 0.03 K/UL
BASOPHILS NFR BLD: 0.4 %
BASOPHILS NFR BLD: 0.4 %
BUN SERPL-MCNC: 10 MG/DL
CALCIUM SERPL-MCNC: 8 MG/DL
CHLORIDE SERPL-SCNC: 110 MMOL/L
CO2 SERPL-SCNC: 21 MMOL/L
CREAT SERPL-MCNC: 0.8 MG/DL
DIFFERENTIAL METHOD: ABNORMAL
DIFFERENTIAL METHOD: ABNORMAL
EOSINOPHIL # BLD AUTO: 0.2 K/UL
EOSINOPHIL # BLD AUTO: 0.2 K/UL
EOSINOPHIL NFR BLD: 2.5 %
EOSINOPHIL NFR BLD: 3 %
ERYTHROCYTE [DISTWIDTH] IN BLOOD BY AUTOMATED COUNT: 16.6 %
ERYTHROCYTE [DISTWIDTH] IN BLOOD BY AUTOMATED COUNT: 16.7 %
EST. GFR  (AFRICAN AMERICAN): >60 ML/MIN/1.73 M^2
EST. GFR  (NON AFRICAN AMERICAN): >60 ML/MIN/1.73 M^2
GLUCOSE SERPL-MCNC: 83 MG/DL
HCT VFR BLD AUTO: 27.3 %
HCT VFR BLD AUTO: 28 %
HGB BLD-MCNC: 7.4 G/DL
HGB BLD-MCNC: 7.4 G/DL
HYPOCHROMIA BLD QL SMEAR: ABNORMAL
IMM GRANULOCYTES # BLD AUTO: 0.04 K/UL
IMM GRANULOCYTES # BLD AUTO: 0.05 K/UL
IMM GRANULOCYTES NFR BLD AUTO: 0.5 %
IMM GRANULOCYTES NFR BLD AUTO: 0.6 %
LYMPHOCYTES # BLD AUTO: 1.3 K/UL
LYMPHOCYTES # BLD AUTO: 1.9 K/UL
LYMPHOCYTES NFR BLD: 15.1 %
LYMPHOCYTES NFR BLD: 23.1 %
MCH RBC QN AUTO: 18.6 PG
MCH RBC QN AUTO: 18.9 PG
MCHC RBC AUTO-ENTMCNC: 26.4 G/DL
MCHC RBC AUTO-ENTMCNC: 27.1 G/DL
MCV RBC AUTO: 70 FL
MCV RBC AUTO: 71 FL
MONOCYTES # BLD AUTO: 0.8 K/UL
MONOCYTES # BLD AUTO: 1.1 K/UL
MONOCYTES NFR BLD: 10 %
MONOCYTES NFR BLD: 13.5 %
NEUTROPHILS # BLD AUTO: 4.8 K/UL
NEUTROPHILS # BLD AUTO: 6 K/UL
NEUTROPHILS NFR BLD: 59.4 %
NEUTROPHILS NFR BLD: 71.5 %
NRBC BLD-RTO: 0 /100 WBC
NRBC BLD-RTO: 0 /100 WBC
OVALOCYTES BLD QL SMEAR: ABNORMAL
PLATELET # BLD AUTO: 514 K/UL
PLATELET # BLD AUTO: 591 K/UL
PMV BLD AUTO: 10.1 FL
PMV BLD AUTO: 10.1 FL
POC ACTIVATED CLOTTING TIME K: 191 SEC (ref 74–137)
POC ACTIVATED CLOTTING TIME K: 213 SEC (ref 74–137)
POC ACTIVATED CLOTTING TIME K: 257 SEC (ref 74–137)
POIKILOCYTOSIS BLD QL SMEAR: SLIGHT
POLYCHROMASIA BLD QL SMEAR: ABNORMAL
POTASSIUM SERPL-SCNC: 4.1 MMOL/L
RBC # BLD AUTO: 3.91 M/UL
RBC # BLD AUTO: 3.97 M/UL
SAMPLE: ABNORMAL
SODIUM SERPL-SCNC: 138 MMOL/L
WBC # BLD AUTO: 8.13 K/UL
WBC # BLD AUTO: 8.42 K/UL

## 2018-05-02 PROCEDURE — 85025 COMPLETE CBC W/AUTO DIFF WBC: CPT | Mod: 91

## 2018-05-02 PROCEDURE — 25000003 PHARM REV CODE 250: Performed by: STUDENT IN AN ORGANIZED HEALTH CARE EDUCATION/TRAINING PROGRAM

## 2018-05-02 PROCEDURE — 25000003 PHARM REV CODE 250: Performed by: INTERNAL MEDICINE

## 2018-05-02 PROCEDURE — 80048 BASIC METABOLIC PNL TOTAL CA: CPT

## 2018-05-02 PROCEDURE — 36415 COLL VENOUS BLD VENIPUNCTURE: CPT

## 2018-05-02 RX ADMIN — ASPIRIN 81 MG: 81 TABLET, COATED ORAL at 09:05

## 2018-05-02 RX ADMIN — SPIRONOLACTONE 25 MG: 25 TABLET, FILM COATED ORAL at 09:05

## 2018-05-02 RX ADMIN — TICAGRELOR 90 MG: 90 TABLET ORAL at 09:05

## 2018-05-02 RX ADMIN — AMLODIPINE BESYLATE 5 MG: 5 TABLET ORAL at 09:05

## 2018-05-02 RX ADMIN — LISINOPRIL 10 MG: 10 TABLET ORAL at 09:05

## 2018-05-02 RX ADMIN — HYDROCHLOROTHIAZIDE 25 MG: 25 TABLET ORAL at 09:05

## 2018-05-02 RX ADMIN — METOPROLOL SUCCINATE 100 MG: 100 TABLET, EXTENDED RELEASE ORAL at 09:05

## 2018-05-02 NOTE — PROGRESS NOTES
Up in chair using walker with minimial assistance. Mirtha well. Pleasantly confused and cooperative. Wrong pt

## 2018-05-02 NOTE — DISCHARGE SUMMARY
Discharge Summary  Interventional Cardiology      Admit Date: 5/1/2018    Discharge Date:  5/2/2018    Attending Physician: Rajesh Oliver MD    Discharge Physician: Meg Santana MD    Principal Diagnoses: CAD  Indication for Admission: brachytherapy of mid LAD ISR  Discharged Condition: Good    Hospital Course:   Patient presented for outpatient [facility] which went without complication. LHC was notable for ISR of mLAD. The patient is s/p POBA and brachytherapy. She was monitored overnight. There were no complications.     Outpatient Plan:  - Follow-up appointment in 2 weeks with Dr. Fátima Salvador (primary cardiologist)  - There were no medication changes    Diet: Cardiac diet    Activity: Ad celso, wound care instructions provided    Discharge Instructions and Care of Your Wrist After a Cardiac Catheterization Procedure Performed via the Radial Artery    For 24 Hours following the procedure:   Do not subject your affected hand/arm to any forceful movements (i.e. supporting weight when rising from a chair or bed)   Do not drive a car for 24 hours   The dressing (band-aid) on the puncture site may be removed after 24 hours and left open to air.  If there is minor oozing, you may apply another Band-aid and remove after 12 hours   You may shower on the day following your procedure.  Do not take a tub bath or submerge the puncture site in water for 3 days following the procedure    For 48 hours following the procedure:    Do not lift anything heavier than 3 to 5 pounds with the affected hand/arm   Do not operate a lawnmower, motorcycle, chainsaw, or all-terrain vehicle    Avoid excessive (extension/flexion) wrist movement (i.e. supporting weight when rising from a chair or bed, push-ups, lifting garage doors, etc.)   Do not engage in vigorous exercise (i.e. Tennis, Golf, Bowling) using the affected arm    If bleeding should occur following discharge:   Sit down and apply firm pressure to the puncture site with  your fingers for 10 minutes    If the bleeding stops, continue to sit quietly, keeping your wrist straight for 2 hours. Notify your physician as soon as possible    If bleeding does not stop after 10 minutes or if there is a large amount of bleeding or spurting, call 911 immediately.  Do not drive yourself to the hospital.     You should expect mild tingling in your hand and tenderness at the puncture site for up to 3 days.     Notify your physician if these symptoms persist or if you experience:   Change in color or temperature of the hand or arm   Redness, heat, or pus at the puncture site   Chills or fever greater than 101.0 F        Disposition: Home or Self Care    Discharge Medications:      Medication List      CHANGE how you take these medications    amLODIPine 5 MG tablet  Commonly known as:  NORVASC  Take 1 tablet (5 mg total) by mouth once daily.  What changed:  how much to take     lisinopril 40 MG tablet  Commonly known as:  PRINIVIL,ZESTRIL  Take 1 tablet (40 mg total) by mouth once daily.  What changed:  how much to take        CONTINUE taking these medications    aspirin 81 MG EC tablet  Commonly known as:  ECOTRIN  Take 1 tablet (81 mg total) by mouth once daily.     atorvastatin 40 MG tablet  Commonly known as:  LIPITOR  Take 1 tablet (40 mg total) by mouth every evening.     IRON ORAL     metoprolol succinate 100 MG 24 hr tablet  Commonly known as:  TOPROL-XL     nitroGLYCERIN 0.4 MG SL tablet  Commonly known as:  NITROSTAT  Place 1 tablet (0.4 mg total) under the tongue every 5 (five) minutes as needed for Chest pain.     ondansetron 4 MG Tbdl  Commonly known as:  ZOFRAN-ODT  Take 1 tablet (4 mg total) by mouth every 6 (six) hours as needed.     spironolactone-hydrochlorothiazide 25-25mg 25-25 mg Tab  Commonly known as:  ALDACTAZIDE  Take 1 tablet by mouth once daily.     ticagrelor 90 mg tablet  Commonly known as:  BRILINTA  Take 1 tablet (90 mg total) by mouth 2 (two) times daily.      VITAMIN B-12 ORAL          Follow Up:  Follow-up Information     Fátima Salvador MD In 2 weeks.    Specialty:  Cardiology  Why:  s/p LHC with POBA and brachytherapy for mLAD  Contact information:  [address] [id number] [phone]                 Discussed with Dr. Oliver.    Signed:  Meg Santana MD  Cardiology Fellow  Pager: [phone]  5/2/2018 7:16 AM

## 2018-05-02 NOTE — PROGRESS NOTES
Pt discharged per MD order. AVS reviewed with Pt and family. VSS. PIV removed intact x 1. Telemetry monitor removed and notifed monitor room. Pt and family verbalized understanding. Awaiting escort and will continue to monitor pt.

## 2018-05-28 PROBLEM — R07.9 CHEST PAIN: Status: ACTIVE | Noted: 2018-05-28

## 2018-05-28 PROBLEM — E04.1 THYROID NODULE: Status: ACTIVE | Noted: 2018-05-28

## 2018-06-08 ENCOUNTER — TELEPHONE (OUTPATIENT)
Dept: OTOLARYNGOLOGY | Facility: CLINIC | Age: 38
End: 2018-06-08

## 2018-06-08 DIAGNOSIS — Z85.3 HX: BREAST CANCER: Primary | ICD-10-CM

## 2018-06-08 NOTE — TELEPHONE ENCOUNTER
----- Message from Maribeth Alonso sent at 6/8/2018  1:24 PM CDT -----  Contact: patient   Please call above patient has thyroid problem need appointment waiting on a call from the nurse

## 2018-06-08 NOTE — TELEPHONE ENCOUNTER
Called and spoke with patient whom stated that she need an appointment. Assisted patient in scheduling an appointment. Patient stated date and time of appointment was fine.

## 2018-06-12 ENCOUNTER — HOSPITAL ENCOUNTER (OUTPATIENT)
Dept: RADIOLOGY | Facility: HOSPITAL | Age: 38
Discharge: HOME | End: 2018-06-12
Attending: FAMILY MEDICINE
Payer: MEDICAID

## 2018-06-12 ENCOUNTER — INITIAL CONSULT (OUTPATIENT)
Dept: OTOLARYNGOLOGY | Facility: CLINIC | Age: 38
End: 2018-06-12
Payer: MEDICAID

## 2018-06-12 ENCOUNTER — TELEPHONE (OUTPATIENT)
Dept: OTOLARYNGOLOGY | Facility: CLINIC | Age: 38
End: 2018-06-12

## 2018-06-12 VITALS — HEIGHT: 66 IN | BODY MASS INDEX: 27.8 KG/M2 | WEIGHT: 173 LBS

## 2018-06-12 VITALS
DIASTOLIC BLOOD PRESSURE: 85 MMHG | TEMPERATURE: 97 F | BODY MASS INDEX: 27.88 KG/M2 | HEIGHT: 66 IN | WEIGHT: 173.5 LBS | SYSTOLIC BLOOD PRESSURE: 137 MMHG | HEART RATE: 64 BPM

## 2018-06-12 DIAGNOSIS — Z85.3 HX: BREAST CANCER: ICD-10-CM

## 2018-06-12 DIAGNOSIS — E04.1 THYROID NODULE: Primary | ICD-10-CM

## 2018-06-12 DIAGNOSIS — I25.10 CORONARY ARTERY DISEASE DUE TO LIPID RICH PLAQUE: ICD-10-CM

## 2018-06-12 DIAGNOSIS — I25.83 CORONARY ARTERY DISEASE DUE TO LIPID RICH PLAQUE: ICD-10-CM

## 2018-06-12 DIAGNOSIS — I25.2 HISTORY OF ST ELEVATION MYOCARDIAL INFARCTION (STEMI): ICD-10-CM

## 2018-06-12 PROCEDURE — 77061 BREAST TOMOSYNTHESIS UNI: CPT | Mod: TC,PO

## 2018-06-12 PROCEDURE — 77061 BREAST TOMOSYNTHESIS UNI: CPT | Mod: 26,,, | Performed by: RADIOLOGY

## 2018-06-12 PROCEDURE — 77065 DX MAMMO INCL CAD UNI: CPT | Mod: 26,,, | Performed by: RADIOLOGY

## 2018-06-12 PROCEDURE — 99213 OFFICE O/P EST LOW 20 MIN: CPT | Mod: PBBFAC | Performed by: OTOLARYNGOLOGY

## 2018-06-12 PROCEDURE — 77065 DX MAMMO INCL CAD UNI: CPT | Mod: TC,PO

## 2018-06-12 PROCEDURE — 99204 OFFICE O/P NEW MOD 45 MIN: CPT | Mod: S$PBB,,, | Performed by: OTOLARYNGOLOGY

## 2018-06-12 PROCEDURE — 99999 PR PBB SHADOW E&M-EST. PATIENT-LVL III: CPT | Mod: PBBFAC,,, | Performed by: OTOLARYNGOLOGY

## 2018-06-12 NOTE — PROGRESS NOTES
Head and Neck Surgery Consult    CC: thyroid nodule    HPI: Susanne Hutton is a 38 y.o. female presenting with a L thyroid nodule discovered during a workup for SOB. She has a personal history of breast cancer and is S/P mastectomy for malignant Phylloides tumor, as well as a STEMI. She is on ASA and Brilinta. She has a history of thyroid carcinoma in her mother (when her mother was in her 30s) and goitre in her maternal grandmother. She denies compressive symptoms, systemic symptoms of hypo or hyperthyroidism, has no family history of other endocrine malignancies and has no history of ionizing radiation exposure to the neck.    Past Medical History:   Diagnosis Date    Anemia     Anxiety     Cancer     breast left    Coronary artery disease     Hypertension     MI (myocardial infarction) 07/2017       Past Surgical History:   Procedure Laterality Date    BREAST LUMPECTOMY W/ NEEDLE LOCALIZATION Left 08/2016    benign    BREAST RECONSTRUCTION      BREAST SURGERY  2009    left mastectomy with implant reconstruction    CARDIAC SURGERY      coronary stent    CORONARY ANGIOPLASTY WITH STENT PLACEMENT      7/25/2017    diagnostic laparoscopy  2013    LEFT HEART CATHETERIZATION N/A 5/28/2018    Procedure: Left heart cath;  Surgeon: Fátima Salvador MD;  Location: [facility] CATH;  Service: Cardiology;  Laterality: N/A;    MASTECTOMY      left    OTHER SURGICAL HISTORY  VATS    Mediatinal node biopsy    tissue expansion Left     TUMOR REMOVAL  2010    lung-benign         Current Outpatient Prescriptions:     amLODIPine (NORVASC) 10 MG tablet, Take 10 mg by mouth once daily., Disp: , Rfl:     aspirin (ECOTRIN) 81 MG EC tablet, Take 1 tablet (81 mg total) by mouth once daily., Disp: , Rfl: 0    atorvastatin (LIPITOR) 40 MG tablet, Take 1 tablet (40 mg total) by mouth every evening., Disp: 90 tablet, Rfl: 3    CYANOCOBALAMIN, VITAMIN B-12, (VITAMIN B-12 ORAL), Take 1 tablet by mouth once daily. 500 mcg,  Disp: , Rfl:     FERROUS SULFATE (IRON ORAL), Take 1 tablet by mouth 2 (two) times daily. , Disp: , Rfl:     lisinopril 10 MG tablet, Take 10 mg by mouth once daily., Disp: , Rfl:     metoprolol succinate (TOPROL-XL) 50 MG 24 hr tablet, Take 50 mg by mouth once daily., Disp: , Rfl:     nitroGLYCERIN (NITROSTAT) 0.4 MG SL tablet, Place 1 tablet (0.4 mg total) under the tongue every 5 (five) minutes as needed for Chest pain., Disp: 90 tablet, Rfl: 1    spironolactone-hydrochlorothiazide 25-25mg (ALDACTAZIDE) 25-25 mg Tab, Take 1 tablet by mouth once daily., Disp: 30 tablet, Rfl: 11    ticagrelor (BRILINTA) 90 mg tablet, Take 1 tablet (90 mg total) by mouth 2 (two) times daily., Disp: 180 tablet, Rfl: 3    Review of patient's allergies indicates:   Allergen Reactions    Feraheme [ferumoxytol] Anaphylaxis       Family History   Problem Relation Age of Onset    Hypertension Mother     Cancer Mother         thyroid       Social History     Social History    Marital status: Single     Spouse name: N/A    Number of children: N/A    Years of education: N/A     Occupational History    Not on file.     Social History Main Topics    Smoking status: Never Smoker    Smokeless tobacco: Never Used    Alcohol use No    Drug use: No    Sexual activity: Not on file     Other Topics Concern    Not on file     Social History Narrative    No narrative on file       Review of Systems -  Constitutional: Denies having night sweats, constant fatigue, loss of appetite or recent substantial weight loss.  Eyes: Denies blurred vision or double vision.  Respiratory: Denies symptoms of shortness of breath, noisy breathing, hoarseness or chronic cough.  GI: Denies symptoms of heartburn, acid regurgitation, or the known presence of a hiatal hernia.  The remainder of a 10-point review of systems is negative    REVIEW OF RADIOLOGICAL FILMS AND RECORDS (PERSONALLY REVIEWED):  There is a complex mixed cystic, solid nodule left  "inferiorly measuring 3.2 x 1.7 x 2.1 cm on U/S    PHYSICAL EXAM:  Vitals - /85   Pulse 64   Temp 97.4 °F (36.3 °C) (Tympanic)   Ht 5' 6" (1.676 m)   Wt 78.7 kg (173 lb 8 oz)   LMP 05/29/2018   BMI 28.00 kg/m²   Constitutional -      General Appearance: well developed, well nourished, without obvious deformities     Communication: speaks with a normal voice without hoarseness  Head & Face -     Overall: no obvious scars, lesions or masses     Parotid and submandibular glands: no masses or tenderness     Facial strength: normal and equal bilaterally  Eyes -      EOM intact  Ear, Nose, Mouth & Throat -     Ears: both left and right external auditory canals and TM's are normal, no external deformities     Nasal exam: mucosa is pink, septum is midline, visible turbinates are normal on anterior rhinoscopy     Mastication: teeth appear in good repair     Oral Cavity and oropharynx: mucosa, hard and soft palates, tongue, posterior pharyngeal wall, lips and gums are without lesions. Tonsils appear 1+  Respiratory:     Breathing unlabored  Larynx: using the mirror for indirect laryngoscopy, the epiglottic, false cords, true cords, and pyriform sinuses are without lesions and the true vocal cords move normally     Neck: appears symmetric, and on palpation is without masses or lymphadenopathy     Thyroid: no asymmetry, thyromegaly, or thyroid nodules on palpation  Cranial Nerves:      II: Pupillary reflexes normal     III, IV, VI: EOM normal     V: 1,2,3: normal sensation     VII: Normal strength in all divisions     IX, X: Normal voice, palatal elevation and sensation     XI: Shoulder strength normal       XII: Tongue mobility normal  Psychiatric:     Appropriate affect    ASSESSMENT: L complex thyroid nodule in patient with personal history of breast malignancy and family history of thyroid malignancy    PLAN: Discussed options including FNA vs thyroid lobectomy. I discussed the risks of sampling error in FNA in a " nodule of this size, and the fact her risk of malignancy is slightly elevated due to her family history. She would like to proceed with FNA and determine the need for lobectomy based upon these results. I feel the risk of metastatic phylloides tumor to the thyroid is very low. She will see me ~1 week after FNA for results.       Ike Alvarado

## 2018-06-12 NOTE — TELEPHONE ENCOUNTER
----- Message from Serjio Finley sent at 6/12/2018  3:09 PM CDT -----  Contact: [phone]  Please follow up with the pt at number given/listed above in regard to scheduling a biopsy with provider.

## 2018-06-14 ENCOUNTER — TELEPHONE (OUTPATIENT)
Dept: OTOLARYNGOLOGY | Facility: CLINIC | Age: 38
End: 2018-06-14

## 2018-06-14 NOTE — TELEPHONE ENCOUNTER
----- Message from Serjio Finley sent at 6/12/2018  3:09 PM CDT -----  Contact: [phone]  Please follow up with the pt at number given/listed above in regard to scheduling a biopsy with provider.

## 2018-06-18 ENCOUNTER — OFFICE VISIT (OUTPATIENT)
Dept: OTOLARYNGOLOGY | Facility: CLINIC | Age: 38
End: 2018-06-18
Payer: MEDICAID

## 2018-06-18 VITALS
HEART RATE: 80 BPM | SYSTOLIC BLOOD PRESSURE: 159 MMHG | DIASTOLIC BLOOD PRESSURE: 89 MMHG | BODY MASS INDEX: 28.08 KG/M2 | WEIGHT: 173.94 LBS | TEMPERATURE: 98 F

## 2018-06-18 DIAGNOSIS — E04.1 THYROID NODULE: Primary | ICD-10-CM

## 2018-06-18 PROCEDURE — 99213 OFFICE O/P EST LOW 20 MIN: CPT | Mod: PBBFAC | Performed by: OTOLARYNGOLOGY

## 2018-06-18 PROCEDURE — 99214 OFFICE O/P EST MOD 30 MIN: CPT | Mod: S$PBB,,, | Performed by: OTOLARYNGOLOGY

## 2018-06-18 PROCEDURE — 99999 PR PBB SHADOW E&M-EST. PATIENT-LVL III: CPT | Mod: PBBFAC,,, | Performed by: OTOLARYNGOLOGY

## 2018-06-18 NOTE — PROGRESS NOTES
Head & Neck Surgery Follow-Up    Interval history:  No interval changes. She was slated to undergo FNA of her suspicious thyroid lesion, however her cardiologist did not feel it was safe to hold her anticoagulants for a week prior to this biopsy. In light of her significant family history of thyroid cancer at a young age and suspicious nodule, she is here to discuss potential treatment options.     Exam:  L thyroid unchanged  No other neck masses/LAD  No voice changed    A: Suspicious L thyroid nodule, significant family history of thyroid malignancy    P: I discussed her increased risk of thyroid malignancy given her U/S appearance and family history. I will contact her cardiologist regarding potential management options for her anticoagulation, understanding she had a stent 3 months ago. It may be possible to temporarily change her anticoagulant regimen to something with a shorter half-life to allow thyroid lobectomy, then rapidly resume her anticoagulant regimen postoperatively. We have tentatively planned for August 9, and I will contact Dr. Salvador, her cardiologist, in the interim in this regard.

## 2018-07-11 PROBLEM — I25.84 CORONARY ARTERY DISEASE DUE TO CALCIFIED CORONARY LESION: Status: ACTIVE | Noted: 2017-08-14

## 2018-07-26 ENCOUNTER — TELEPHONE (OUTPATIENT)
Dept: CARDIOLOGY | Facility: CLINIC | Age: 38
End: 2018-07-26

## 2018-07-26 NOTE — TELEPHONE ENCOUNTER
Patient called stating that she is having chest tightness and feels like something is twisting in her chest. States this is how she felt when her cardiac stent clotted. Instructed patient to go to the emergency room and not to drive. Have someone drive her. Patient verbalized understanding.

## 2018-08-13 ENCOUNTER — TELEPHONE (OUTPATIENT)
Dept: OTOLARYNGOLOGY | Facility: CLINIC | Age: 38
End: 2018-08-13

## 2018-08-13 NOTE — TELEPHONE ENCOUNTER
----- Message from Maribeht Alonso sent at 8/13/2018  3:38 PM CDT -----  Contact: patient  Please call above patient wants to know if surgery was approved

## 2018-10-24 PROBLEM — R07.9 CHEST PAIN: Status: RESOLVED | Noted: 2018-05-28 | Resolved: 2018-10-24

## 2019-05-30 PROBLEM — R94.39 ABNORMAL NUCLEAR STRESS TEST: Status: ACTIVE | Noted: 2019-05-30

## 2019-10-14 PROBLEM — E87.6 HYPOKALEMIA: Status: RESOLVED | Noted: 2017-09-19 | Resolved: 2019-10-14

## 2020-11-18 ENCOUNTER — TELEPHONE (OUTPATIENT)
Dept: GENETICS | Facility: CLINIC | Age: 40
End: 2020-11-18

## 2020-11-18 NOTE — TELEPHONE ENCOUNTER
----- Message from Marlen Huff sent at 11/18/2020 12:49 PM CST -----  Regarding: schedule  Contact: pt [phone]  Pt is calling to speak with someone in the genetics dept about a test she had a long time ago she will like to schedule to meet with someone

## 2020-11-18 NOTE — TELEPHONE ENCOUNTER
Spoke with pt regarding msg. pt states she saw Dr. Godoy in the past, but did not follow up with genetic testing. Pt states pcp is requesting for her to follow up on getting tested. Appt has been scheduled wit our GC . Pt gave verbal understanding.

## 2020-12-01 DIAGNOSIS — R92.8 ABNORMAL MAMMOGRAM: Primary | ICD-10-CM

## 2020-12-15 ENCOUNTER — TELEPHONE (OUTPATIENT)
Dept: GENETICS | Facility: CLINIC | Age: 40
End: 2020-12-15

## 2020-12-16 ENCOUNTER — TELEPHONE (OUTPATIENT)
Dept: GENETICS | Facility: CLINIC | Age: 40
End: 2020-12-16

## 2020-12-16 NOTE — TELEPHONE ENCOUNTER
----- Message from Maxime Ware sent at 12/16/2020  8:59 AM CST -----  Contact: Susanne [phone]  Pt is calling in she wants to reschedule apt that she has today 12/16 at 10, but when I tried to schedule it didn't show anything at all and stopped at June so I'm not sure I can schedule it for her can you please call her and reschedule her apt

## 2022-03-30 PROBLEM — R06.09 DOE (DYSPNEA ON EXERTION): Status: ACTIVE | Noted: 2022-03-30

## 2023-02-28 ENCOUNTER — OFFICE VISIT (OUTPATIENT)
Dept: SURGERY | Facility: CLINIC | Age: 43
End: 2023-02-28
Payer: MEDICAID

## 2023-02-28 VITALS
BODY MASS INDEX: 29.66 KG/M2 | HEIGHT: 65 IN | HEART RATE: 58 BPM | SYSTOLIC BLOOD PRESSURE: 204 MMHG | WEIGHT: 178 LBS | DIASTOLIC BLOOD PRESSURE: 91 MMHG

## 2023-02-28 DIAGNOSIS — Z12.31 SCREENING MAMMOGRAM, ENCOUNTER FOR: Primary | ICD-10-CM

## 2023-02-28 DIAGNOSIS — Z90.12 S/P LEFT MASTECTOMY: ICD-10-CM

## 2023-02-28 PROCEDURE — 99999 PR PBB SHADOW E&M-EST. PATIENT-LVL III: CPT | Mod: PBBFAC,,, | Performed by: PHYSICIAN ASSISTANT

## 2023-02-28 PROCEDURE — 3008F BODY MASS INDEX DOCD: CPT | Mod: CPTII,,, | Performed by: PHYSICIAN ASSISTANT

## 2023-02-28 PROCEDURE — 3077F SYST BP >= 140 MM HG: CPT | Mod: CPTII,,, | Performed by: PHYSICIAN ASSISTANT

## 2023-02-28 PROCEDURE — 1159F PR MEDICATION LIST DOCUMENTED IN MEDICAL RECORD: ICD-10-PCS | Mod: CPTII,,, | Performed by: PHYSICIAN ASSISTANT

## 2023-02-28 PROCEDURE — 99204 OFFICE O/P NEW MOD 45 MIN: CPT | Mod: S$PBB,,, | Performed by: PHYSICIAN ASSISTANT

## 2023-02-28 PROCEDURE — 3008F PR BODY MASS INDEX (BMI) DOCUMENTED: ICD-10-PCS | Mod: CPTII,,, | Performed by: PHYSICIAN ASSISTANT

## 2023-02-28 PROCEDURE — 1160F PR REVIEW ALL MEDS BY PRESCRIBER/CLIN PHARMACIST DOCUMENTED: ICD-10-PCS | Mod: CPTII,,, | Performed by: PHYSICIAN ASSISTANT

## 2023-02-28 PROCEDURE — 3077F PR MOST RECENT SYSTOLIC BLOOD PRESSURE >= 140 MM HG: ICD-10-PCS | Mod: CPTII,,, | Performed by: PHYSICIAN ASSISTANT

## 2023-02-28 PROCEDURE — 1160F RVW MEDS BY RX/DR IN RCRD: CPT | Mod: CPTII,,, | Performed by: PHYSICIAN ASSISTANT

## 2023-02-28 PROCEDURE — 99999 PR PBB SHADOW E&M-EST. PATIENT-LVL III: ICD-10-PCS | Mod: PBBFAC,,, | Performed by: PHYSICIAN ASSISTANT

## 2023-02-28 PROCEDURE — 3080F DIAST BP >= 90 MM HG: CPT | Mod: CPTII,,, | Performed by: PHYSICIAN ASSISTANT

## 2023-02-28 PROCEDURE — 3080F PR MOST RECENT DIASTOLIC BLOOD PRESSURE >= 90 MM HG: ICD-10-PCS | Mod: CPTII,,, | Performed by: PHYSICIAN ASSISTANT

## 2023-02-28 PROCEDURE — 1159F MED LIST DOCD IN RCRD: CPT | Mod: CPTII,,, | Performed by: PHYSICIAN ASSISTANT

## 2023-02-28 PROCEDURE — 99204 PR OFFICE/OUTPT VISIT, NEW, LEVL IV, 45-59 MIN: ICD-10-PCS | Mod: S$PBB,,, | Performed by: PHYSICIAN ASSISTANT

## 2023-02-28 PROCEDURE — 99213 OFFICE O/P EST LOW 20 MIN: CPT | Mod: PBBFAC | Performed by: PHYSICIAN ASSISTANT

## 2023-02-28 NOTE — PROGRESS NOTES
[facility]  Department of Surgery    PCP: Maude De La Cruz MD  CHIEF COMPLAINT:   Chief Complaint   Patient presents with    Annual Exam     Np cbe / hx of breast cancer       DIAGNOSIS:   This is a 42 y.o. female with a history of malignant phyllodes of the left breast in 2009 as well as ADH of the right breast in 2016.     TREATMENT:   1. History of malignant phyllodes tumor s/p left skin and nipple sparing mastectomy at [facility] by Dr Radnle 7/20/2009 with pathology report indicating 13cm high grade malignant phyllodes tumor with positive inferior margin and close remaining surgical margins to within 1-3mm. She had a tissue expander placed at time of mastectomy.   2. [facility] secondary to involved margins.   3. She was seen by Dr Jeter with no adjuvant chemo or hormonal therapy recommended.  4. Patient was recommended to undergo annual CT for 5 years following mastectomy.   5. CT of chest 11/2009 with 3mm medistinal lymph node and 13mm nodule left lower lung, lung resection and mediastinal lymph node biopsy by Dr Contreras 7-9-2010 was negative for malignancy. Follow-up CT 9-29-10 with no abnormality reported.    6. Patient then underwent ROBERT flap in 2014 with Dr. López, but had a complicated post operative course. Resulting in multiple surgeries now. Last surgery was with Dr. Ty 8/22/2017.  7. In 2016, patient had an excisional biopsy of her right breast for ADH/CSL.     HISTORY OF PRESENT ILLNESS:   Susanne Hutton is a 42 y.o. female who presents to establish surveillance care. Patient is overdue for her mammogram. States it has been several years since she has had her right breast imaged. Otherwise, patient denies new breast complaints such as new masses, skin or nipple changes, or nipple discharge. Patient is happy with the final stage of her reconstruction and does not desire further surgery despite significant assymetry. She states she had open heart surgery in July of   after multiple failed stents. Patient notes there was damage to her mammary artery due to radiation of her left chest wall in the past which was fixed. The patient denies constitutional symptoms of night sweats, chills, weight loss, new headaches, visual changes, new back or bony pain, chest pain, or shortness of breath.        Review of Systems: See HPI/Interval History for other systems reviewed.     IMAGIN/13/18 Right Diagnostic Mammo:     Findings:  This procedure was performed using tomosynthesis.  Computer-aided detection was utilized in the interpretation of this examination.  The breast is heterogeneously dense, which may obscure small masses.     There are post-surgical findings from a previous excisional biopsy seen in the right breast. There has been no interval development of a suspicious mass, microcalcification, or architectural distortion.      Impression:  There is no mammographic evidence of malignancy.     BI-RADS Category:   Right: 2 - Benign  Overall: 2 - Benign     Recommendation:  Routine screening mammogram in 1 year is recommended.     MEDICATIONS/ALLERGIES:     Current Outpatient Medications   Medication Sig    amLODIPine (NORVASC) 10 MG tablet Take 10 mg by mouth once daily.    ascorbic acid, vitamin C, (VITAMIN C) 100 MG tablet Take 100 mg by mouth once daily.    atorvastatin (LIPITOR) 40 MG tablet Take 1 tablet (40 mg total) by mouth every evening.    BRILINTA 90 mg tablet TAKE 1 TABLET(90 MG) BY MOUTH TWICE DAILY    hydroCHLOROthiazide (HYDRODIURIL) 25 MG tablet Take 1 tablet (25 mg total) by mouth once daily.    HYDROcodone-acetaminophen (NORCO) 5-325 mg per tablet Take 1 tablet by mouth every 8 (eight) hours as needed.     nitroGLYCERIN (NITROSTAT) 0.4 MG SL tablet Place 1 tablet (0.4 mg total) under the tongue every 5 (five) minutes as needed for Chest pain.    aspirin (ECOTRIN) 81 MG EC tablet Take 1 tablet (81 mg total) by mouth once daily.    furosemide (LASIX) 40 MG  "tablet Take 1 tablet (40 mg total) by mouth once daily.    lisinopriL (PRINIVIL,ZESTRIL) 20 MG tablet Take 1 tablet (20 mg total) by mouth once daily.    metoprolol succinate (TOPROL-XL) 100 MG 24 hr tablet Take 1 tablet (100 mg total) by mouth once daily.     No current facility-administered medications for this visit.      Review of patient's allergies indicates:   Allergen Reactions    Feraheme [ferumoxytol] Anaphylaxis       PHYSICAL EXAM:   BP (!) 204/91 (BP Location: Right arm, Patient Position: Sitting, BP Method: Medium (Automatic))   Pulse (!) 58   Ht 5' 5" (1.651 m)   Wt 80.7 kg (178 lb)   LMP 01/10/2023 (Approximate)   BMI 29.62 kg/m²     Physical Exam   Vitals reviewed.  Constitutional: She is oriented to person, place, and time.   HENT:   Head: Normocephalic and atraumatic.   Nose: Nose normal.   Eyes: Pupils are equal, round, and reactive to light. Right eye exhibits no discharge. Left eye exhibits no discharge.   Pulmonary/Chest: Effort normal and breath sounds normal. No stridor. No respiratory distress. She exhibits no mass, no tenderness and no edema. Right breast exhibits no inverted nipple, no mass, no nipple discharge, no skin change and no tenderness. Left breast exhibits no mass, no skin change and no tenderness. No breast swelling or bleeding. Breasts are asymmetrical.       Abdominal: Normal appearance.   Genitourinary: No breast swelling or bleeding.   Neurological: She is alert and oriented to person, place, and time.   Skin: Skin is warm and dry.     Psychiatric: Her behavior is normal. Mood, judgment and thought content normal.     ASSESSMENT:   This is a 42 y.o. female without evidence of recurrence by exam, history or imaging.       PLAN:   1. CBE without concerning findings today. Patient reassured.   2. Continue monthly self breast exams and call the clinic with any changes or problems.  3. Overdue for right mammogram. Will reach out to Radiology about scheduling.   4. Return to " clinic in 1 year  with mammogram.     The patient is in agreement with the plan. Questions were encouraged and answered to patient's satisfaction. Susanne will call our office with any questions or concerns.       Total time spent with the patient: 45.  30 minutes of face to face consultation and 15 minutes of chart review and coordination of care.

## (undated) DEVICE — CLOSURE SKIN STERI STRIP 1/2X4

## (undated) DEVICE — PACK DRAPE UNIVERSAL CONVERTOR

## (undated) DEVICE — POSITIONER HEEL FOAM CONVOLTD

## (undated) DEVICE — GAUZE SPONGE 4X4 12PLY

## (undated) DEVICE — BRA CLASSIC COMFORM BLK SZ 36

## (undated) DEVICE — ELECTRODE REM PLYHSV RETURN 9

## (undated) DEVICE — SUT MONOCRYL PLUS UD 3-0 27

## (undated) DEVICE — GOWN SMART IMP BREATHABLE XXLG

## (undated) DEVICE — DRAIN WND 15FRX3/16X4.7MM TRCR

## (undated) DEVICE — SEE MEDLINE ITEM 157110

## (undated) DEVICE — SUT VICRYL 3-0 27 SH

## (undated) DEVICE — SPONGE LAP 18X18 PREWASHED

## (undated) DEVICE — TAPE MEDIPORE 3  M2963

## (undated) DEVICE — POSITIONER HEAD DONUT 9IN FOAM

## (undated) DEVICE — PAD ABD 8X10 STERILE

## (undated) DEVICE — GLOVE BIOGEL SKINSENSE PI 7.5

## (undated) DEVICE — BRA CLASSIC COMFORT 44 BLACK

## (undated) DEVICE — SUT VICRYL PLUS 2-0 CT1 18

## (undated) DEVICE — EVACUATOR WOUND BULB 100CC

## (undated) DEVICE — BRA CLASSIC COMFORM BLK SZ 38

## (undated) DEVICE — SEE MEDLINE ITEM 153151

## (undated) DEVICE — SYR ONLY LUER LOCK 20CC

## (undated) DEVICE — UNDERGLOVE BIOGEL PI SZ 6.5 LF

## (undated) DEVICE — TRAY DRY SKIN SCRUB PREP

## (undated) DEVICE — DERMABOND SKIN ADHESIVE PROPEN

## (undated) DEVICE — PENCIL ELECTROSURG HOLST W/BLD

## (undated) DEVICE — SUT CHROMIC 3-0 SH 27IN GUT

## (undated) DEVICE — SUT MONOCRYL 3-0 PS-2 UND

## (undated) DEVICE — KIT DYE SPY ELITE

## (undated) DEVICE — Device

## (undated) DEVICE — NDL SAFETY 21X1 1/2

## (undated) DEVICE — SKIN MARKER DEVON 160

## (undated) DEVICE — NDL HYPO REG 25G X 1 1/2

## (undated) DEVICE — UNDERGLOVES BIOGEL PI SZ 7 LF

## (undated) DEVICE — SOL NS 1000CC

## (undated) DEVICE — SET CYSTO IRRIGATION UNIV SPIK

## (undated) DEVICE — GLOVE BIOGEL SKINSENSE PI 8.0

## (undated) DEVICE — PACK UNIV PROCEDURE

## (undated) DEVICE — TRAY FOLEY 16FR INFECTION CONT

## (undated) DEVICE — STAPLER SKIN ROTATING HEAD

## (undated) DEVICE — SUT PDS II 2-0 CT-2 VIL

## (undated) DEVICE — SEE MEDLINE ITEM 146417

## (undated) DEVICE — SUT VICRYL 2-0 36 CT-1

## (undated) DEVICE — ELECTRODE BLADE TEFLON 6

## (undated) DEVICE — BRA CLASSIC COMFORT 46 BEIGE

## (undated) DEVICE — DRESSING MEPILEX BORDR AG 4X10

## (undated) DEVICE — SUT ETHILON 2-0 FS 18IN BLK

## (undated) DEVICE — DRAIN CHANNEL ROUND 19FR

## (undated) DEVICE — SUT MCRYL PLUS 4-0 PS2 27IN

## (undated) DEVICE — SUT 2/0 30IN SILK BLK BRAI

## (undated) DEVICE — APPLIER LIGACLIP SM 9.38IN

## (undated) DEVICE — SEE MEDLINE ITEM 157131

## (undated) DEVICE — POSITIONER IV ARMBOARD FOAM

## (undated) DEVICE — SUT 3-0 ETHILON 18 FS-1

## (undated) DEVICE — GLOVE BIOGEL SKINSENSE PI 6.5

## (undated) DEVICE — SYRINGE 0.9% NACL 10MIL PREFIL

## (undated) DEVICE — SEE MEDLINE ITEM 152622

## (undated) DEVICE — ELECTRODE BLD EXT INSUL 1

## (undated) DEVICE — SYR 10CC LUER LOCK

## (undated) DEVICE — GLOVE BIOGEL SKINSENSE PI 7.0

## (undated) DEVICE — SUT VICRYL PLUS 3-0 SH 18IN

## (undated) DEVICE — SUT STRATAFIX PGAPCL 3 FS-1

## (undated) DEVICE — TAPE MEDIPORE 3 X 10YD

## (undated) DEVICE — BRA CLASSIC COMFORT BLK SZ 34

## (undated) DEVICE — BRA CLASSIC COMFORT 40 BLACK

## (undated) DEVICE — SYR LUER LOCK TIP 60ML

## (undated) DEVICE — BRA CLASSIC COMFORT 42BLACK